# Patient Record
Sex: FEMALE | Race: WHITE | NOT HISPANIC OR LATINO | Employment: FULL TIME | URBAN - METROPOLITAN AREA
[De-identification: names, ages, dates, MRNs, and addresses within clinical notes are randomized per-mention and may not be internally consistent; named-entity substitution may affect disease eponyms.]

---

## 2019-01-09 ENCOUNTER — OFFICE VISIT (OUTPATIENT)
Dept: FAMILY MEDICINE | Facility: CLINIC | Age: 39
End: 2019-01-09
Payer: COMMERCIAL

## 2019-01-09 VITALS
WEIGHT: 165.69 LBS | OXYGEN SATURATION: 99 % | HEART RATE: 103 BPM | BODY MASS INDEX: 29.36 KG/M2 | DIASTOLIC BLOOD PRESSURE: 74 MMHG | HEIGHT: 63 IN | TEMPERATURE: 99 F | SYSTOLIC BLOOD PRESSURE: 112 MMHG

## 2019-01-09 DIAGNOSIS — F32.A DEPRESSION, UNSPECIFIED DEPRESSION TYPE: ICD-10-CM

## 2019-01-09 DIAGNOSIS — F90.0 ADHD, PREDOMINANTLY INATTENTIVE TYPE: ICD-10-CM

## 2019-01-09 DIAGNOSIS — N30.00 ACUTE CYSTITIS WITHOUT HEMATURIA: Primary | ICD-10-CM

## 2019-01-09 PROCEDURE — 3008F BODY MASS INDEX DOCD: CPT | Mod: CPTII,S$GLB,, | Performed by: FAMILY MEDICINE

## 2019-01-09 PROCEDURE — 99999 PR PBB SHADOW E&M-NEW PATIENT-LVL III: ICD-10-PCS | Mod: PBBFAC,,, | Performed by: FAMILY MEDICINE

## 2019-01-09 PROCEDURE — 3008F PR BODY MASS INDEX (BMI) DOCUMENTED: ICD-10-PCS | Mod: CPTII,S$GLB,, | Performed by: FAMILY MEDICINE

## 2019-01-09 PROCEDURE — 99204 PR OFFICE/OUTPT VISIT, NEW, LEVL IV, 45-59 MIN: ICD-10-PCS | Mod: S$GLB,,, | Performed by: FAMILY MEDICINE

## 2019-01-09 PROCEDURE — 99204 OFFICE O/P NEW MOD 45 MIN: CPT | Mod: S$GLB,,, | Performed by: FAMILY MEDICINE

## 2019-01-09 PROCEDURE — 99999 PR PBB SHADOW E&M-NEW PATIENT-LVL III: CPT | Mod: PBBFAC,,, | Performed by: FAMILY MEDICINE

## 2019-01-09 RX ORDER — NITROFURANTOIN 25; 75 MG/1; MG/1
100 CAPSULE ORAL 2 TIMES DAILY
Qty: 6 CAPSULE | Refills: 0 | Status: SHIPPED | OUTPATIENT
Start: 2019-01-09 | End: 2019-01-12

## 2019-01-09 RX ORDER — DESIPRAMINE HYDROCHLORIDE 75 MG/1
75 TABLET ORAL DAILY
COMMUNITY
End: 2019-01-09 | Stop reason: SDUPTHER

## 2019-01-09 RX ORDER — DESIPRAMINE HYDROCHLORIDE 75 MG/1
75 TABLET ORAL NIGHTLY
Qty: 90 TABLET | Refills: 0 | Status: SHIPPED | OUTPATIENT
Start: 2019-01-09 | End: 2019-05-07 | Stop reason: SDUPTHER

## 2019-01-09 NOTE — PROGRESS NOTES
Subjective:   Patient ID:  Shannan Alford is a 38 y.o. female.    Chief Complaint:  Establish Care and Urinary Tract Infection    Past Medical History:   Diagnosis Date    ADHD (attention deficit hyperactivity disorder)     Depression      History reviewed. No pertinent surgical history.  Family History   Problem Relation Age of Onset    Hypertension Mother     No Known Problems Father      Review of patient's allergies indicates:  No Known Allergies      Patient presents for evaluation of urinary tract infection.  Reports recurrent UTI approximately 2-3 per year.  Last episode July.  Treated with Macrobid or Levaquin.  Symptoms similar to previous episodes  Medical history for depression and ADHD.  Stable on desipramine.  Needs refill.  Recently relocated from dialysis.  .  No additional complaints concerns today.      Urinary Tract Infection    This is a recurrent problem. The current episode started acute onset. The problem occurs every urination. The problem has been unchanged. The pain is at a severity of 0/10. The patient is experiencing no pain. The maximum temperature recorded prior to her arrival was 100 - 100.9 F. The fever has been present for less than 1 day. She is sexually active. There is no history of pyelonephritis. Associated symptoms include frequency and urgency. Pertinent negatives include no behavior changes, chills, discharge, flank pain, hematuria, hesitancy, nausea, possible pregnancy, sweats, vomiting, weight loss, bubble bath use, constipation, rash or withholding. She has tried nothing for the symptoms. Her past medical history is significant for recurrent UTIs.       Review of Systems   Constitutional: Negative for chills, fatigue, fever and weight loss.   Respiratory: Negative for shortness of breath.    Cardiovascular: Negative for chest pain and leg swelling.   Gastrointestinal: Negative for abdominal pain, constipation, diarrhea, nausea and vomiting.   Genitourinary:  "Positive for frequency and urgency. Negative for decreased urine volume, difficulty urinating, dysuria, enuresis, flank pain, genital sores, hematuria, hesitancy, menstrual problem, pelvic pain, vaginal bleeding, vaginal discharge and vaginal pain.   Musculoskeletal: Negative for myalgias.   Skin: Negative for rash.   Hematological: Negative for adenopathy.       Objective:   /74 (BP Location: Right arm, Patient Position: Sitting, BP Method: Medium (Manual))   Pulse 103   Temp 98.8 °F (37.1 °C) (Tympanic)   Ht 5' 3" (1.6 m)   Wt 75.1 kg (165 lb 10.8 oz)   LMP 12/20/2018   SpO2 99%   BMI 29.35 kg/m²     Physical Exam   Constitutional: Vital signs are normal. She appears well-developed and well-nourished. She is cooperative.  Non-toxic appearance. She does not have a sickly appearance. She does not appear ill. No distress.   HENT:   Mouth/Throat: Oropharynx is clear and moist and mucous membranes are normal.   Eyes: No scleral icterus.   Neck: No thyroid mass and no thyromegaly present.   Cardiovascular: Normal rate, regular rhythm and normal heart sounds. Exam reveals no gallop and no friction rub.   No murmur heard.  Pulmonary/Chest: Effort normal and breath sounds normal. No tachypnea and no bradypnea. No respiratory distress. She has no wheezes. She has no rales.   Abdominal: Soft. Bowel sounds are normal. She exhibits no distension. There is no hepatosplenomegaly. There is tenderness in the suprapubic area. There is no rebound, no guarding and no CVA tenderness.   Musculoskeletal: She exhibits no edema.   Neurological: She displays a negative Romberg sign. Coordination and gait normal.   Skin: Skin is warm, dry and intact. No abrasion, no bruising, no ecchymosis and no rash noted.   Psychiatric: She has a normal mood and affect. Her speech is normal and behavior is normal. Judgment and thought content normal. Her mood appears not anxious. Her affect is not angry, not blunt, not labile and not " inappropriate. She is not agitated, not aggressive, not hyperactive, not slowed, not withdrawn, not actively hallucinating and not combative. Thought content is not paranoid and not delusional. Cognition and memory are normal. She does not exhibit a depressed mood. She expresses no homicidal and no suicidal ideation. She is attentive.   Nursing note and vitals reviewed.    Assessment:     1. Acute cystitis without hematuria    2. Depression, unspecified depression type    3. ADHD, predominantly inattentive type      Plan:   Acute cystitis without hematuria  -     nitrofurantoin, macrocrystal-monohydrate, (MACROBID) 100 MG capsule; Take 1 capsule (100 mg total) by mouth 2 (two) times daily. for 3 days  Dispense: 6 capsule; Refill: 0  Treat empirically with Macrobid.  Recheck UA CNS to document resolution at annual physical exam    Depression, unspecified depression type  ADHD, predominantly inattentive type  -     desipramine (NORPRAMIN) 75 MG tablet; Take 1 tablet (75 mg total) by mouth nightly.  Dispense: 90 tablet; Refill: 0  Effective with symptoms controlled on present medication and dose.  Continue desipramine 75 mg daily.    Return to clinic 2 weeks for annual physical exam.

## 2019-05-07 DIAGNOSIS — F32.A DEPRESSION, UNSPECIFIED DEPRESSION TYPE: ICD-10-CM

## 2019-05-07 DIAGNOSIS — F90.0 ADHD, PREDOMINANTLY INATTENTIVE TYPE: ICD-10-CM

## 2019-05-07 RX ORDER — DESIPRAMINE HYDROCHLORIDE 75 MG/1
75 TABLET ORAL NIGHTLY
Qty: 30 TABLET | Refills: 0 | Status: SHIPPED | OUTPATIENT
Start: 2019-05-07 | End: 2019-06-12 | Stop reason: SDUPTHER

## 2019-05-07 NOTE — TELEPHONE ENCOUNTER
Approved but for a one month supply only   Patient needs a follow up appointment.  No additional refills without a visit.  Please call and schedule.

## 2019-05-07 NOTE — TELEPHONE ENCOUNTER
Patient advised medication refill sent to pharmacy.  Patient will schedule follow up appointment online.

## 2019-06-12 ENCOUNTER — TELEPHONE (OUTPATIENT)
Dept: INTERNAL MEDICINE | Facility: CLINIC | Age: 39
End: 2019-06-12

## 2019-06-12 ENCOUNTER — OFFICE VISIT (OUTPATIENT)
Dept: INTERNAL MEDICINE | Facility: CLINIC | Age: 39
End: 2019-06-12
Payer: COMMERCIAL

## 2019-06-12 ENCOUNTER — LAB VISIT (OUTPATIENT)
Dept: LAB | Facility: HOSPITAL | Age: 39
End: 2019-06-12
Attending: FAMILY MEDICINE
Payer: COMMERCIAL

## 2019-06-12 VITALS
WEIGHT: 160.06 LBS | BODY MASS INDEX: 28.36 KG/M2 | HEART RATE: 109 BPM | TEMPERATURE: 98 F | SYSTOLIC BLOOD PRESSURE: 104 MMHG | HEIGHT: 63 IN | DIASTOLIC BLOOD PRESSURE: 80 MMHG | OXYGEN SATURATION: 99 %

## 2019-06-12 DIAGNOSIS — F32.A DEPRESSION, UNSPECIFIED DEPRESSION TYPE: ICD-10-CM

## 2019-06-12 DIAGNOSIS — R10.9 RIGHT FLANK PAIN: ICD-10-CM

## 2019-06-12 DIAGNOSIS — N30.00 ACUTE CYSTITIS WITHOUT HEMATURIA: Primary | ICD-10-CM

## 2019-06-12 DIAGNOSIS — F90.0 ADHD, PREDOMINANTLY INATTENTIVE TYPE: ICD-10-CM

## 2019-06-12 DIAGNOSIS — Z00.00 ANNUAL PHYSICAL EXAM: ICD-10-CM

## 2019-06-12 DIAGNOSIS — Z00.00 ANNUAL PHYSICAL EXAM: Primary | ICD-10-CM

## 2019-06-12 PROBLEM — N20.0 NEPHROLITHIASIS: Status: ACTIVE | Noted: 2019-06-12

## 2019-06-12 LAB
ALBUMIN SERPL BCP-MCNC: 3.9 G/DL (ref 3.5–5.2)
ALP SERPL-CCNC: 95 U/L (ref 55–135)
ALT SERPL W/O P-5'-P-CCNC: 10 U/L (ref 10–44)
ANION GAP SERPL CALC-SCNC: 9 MMOL/L (ref 8–16)
AST SERPL-CCNC: 15 U/L (ref 10–40)
BILIRUB SERPL-MCNC: 0.4 MG/DL (ref 0.1–1)
BUN SERPL-MCNC: 11 MG/DL (ref 6–20)
CALCIUM SERPL-MCNC: 9.9 MG/DL (ref 8.7–10.5)
CHLORIDE SERPL-SCNC: 105 MMOL/L (ref 95–110)
CHOLEST SERPL-MCNC: 196 MG/DL (ref 120–199)
CHOLEST/HDLC SERPL: 3.3 {RATIO} (ref 2–5)
CO2 SERPL-SCNC: 25 MMOL/L (ref 23–29)
CREAT SERPL-MCNC: 1 MG/DL (ref 0.5–1.4)
ERYTHROCYTE [DISTWIDTH] IN BLOOD BY AUTOMATED COUNT: 12 % (ref 11.5–14.5)
EST. GFR  (AFRICAN AMERICAN): >60 ML/MIN/1.73 M^2
EST. GFR  (NON AFRICAN AMERICAN): >60 ML/MIN/1.73 M^2
GLUCOSE SERPL-MCNC: 82 MG/DL (ref 70–110)
HCT VFR BLD AUTO: 45 % (ref 37–48.5)
HDLC SERPL-MCNC: 59 MG/DL (ref 40–75)
HDLC SERPL: 30.1 % (ref 20–50)
HGB BLD-MCNC: 14.4 G/DL (ref 12–16)
LDLC SERPL CALC-MCNC: 126.4 MG/DL (ref 63–159)
MCH RBC QN AUTO: 32 PG (ref 27–31)
MCHC RBC AUTO-ENTMCNC: 32 G/DL (ref 32–36)
MCV RBC AUTO: 100 FL (ref 82–98)
NONHDLC SERPL-MCNC: 137 MG/DL
PLATELET # BLD AUTO: 344 K/UL (ref 150–350)
PMV BLD AUTO: 11.3 FL (ref 9.2–12.9)
POTASSIUM SERPL-SCNC: 4.2 MMOL/L (ref 3.5–5.1)
PROT SERPL-MCNC: 7.6 G/DL (ref 6–8.4)
RBC # BLD AUTO: 4.5 M/UL (ref 4–5.4)
SODIUM SERPL-SCNC: 139 MMOL/L (ref 136–145)
TRIGL SERPL-MCNC: 53 MG/DL (ref 30–150)
TSH SERPL DL<=0.005 MIU/L-ACNC: 2.11 UIU/ML (ref 0.4–4)
WBC # BLD AUTO: 5.98 K/UL (ref 3.9–12.7)

## 2019-06-12 PROCEDURE — 99214 OFFICE O/P EST MOD 30 MIN: CPT | Mod: 25,S$GLB,, | Performed by: FAMILY MEDICINE

## 2019-06-12 PROCEDURE — 80053 COMPREHEN METABOLIC PANEL: CPT

## 2019-06-12 PROCEDURE — 3008F PR BODY MASS INDEX (BMI) DOCUMENTED: ICD-10-PCS | Mod: CPTII,S$GLB,, | Performed by: FAMILY MEDICINE

## 2019-06-12 PROCEDURE — 99999 PR PBB SHADOW E&M-EST. PATIENT-LVL III: CPT | Mod: PBBFAC,,, | Performed by: FAMILY MEDICINE

## 2019-06-12 PROCEDURE — 99214 PR OFFICE/OUTPT VISIT, EST, LEVL IV, 30-39 MIN: ICD-10-PCS | Mod: 25,S$GLB,, | Performed by: FAMILY MEDICINE

## 2019-06-12 PROCEDURE — 99999 PR PBB SHADOW E&M-EST. PATIENT-LVL III: ICD-10-PCS | Mod: PBBFAC,,, | Performed by: FAMILY MEDICINE

## 2019-06-12 PROCEDURE — 99395 PREV VISIT EST AGE 18-39: CPT | Mod: S$GLB,,, | Performed by: FAMILY MEDICINE

## 2019-06-12 PROCEDURE — 84443 ASSAY THYROID STIM HORMONE: CPT

## 2019-06-12 PROCEDURE — 80061 LIPID PANEL: CPT

## 2019-06-12 PROCEDURE — 36415 COLL VENOUS BLD VENIPUNCTURE: CPT

## 2019-06-12 PROCEDURE — 99395 PR PREVENTIVE VISIT,EST,18-39: ICD-10-PCS | Mod: S$GLB,,, | Performed by: FAMILY MEDICINE

## 2019-06-12 PROCEDURE — 85027 COMPLETE CBC AUTOMATED: CPT

## 2019-06-12 PROCEDURE — 3008F BODY MASS INDEX DOCD: CPT | Mod: CPTII,S$GLB,, | Performed by: FAMILY MEDICINE

## 2019-06-12 RX ORDER — CIPROFLOXACIN 500 MG/1
500 TABLET ORAL 2 TIMES DAILY
Qty: 20 TABLET | Refills: 0 | Status: SHIPPED | OUTPATIENT
Start: 2019-06-12 | End: 2019-06-22

## 2019-06-12 RX ORDER — DESIPRAMINE HYDROCHLORIDE 75 MG/1
75 TABLET ORAL NIGHTLY
Qty: 90 TABLET | Refills: 3 | Status: ON HOLD | OUTPATIENT
Start: 2019-06-12 | End: 2019-08-02 | Stop reason: HOSPADM

## 2019-06-12 NOTE — TELEPHONE ENCOUNTER
----- Message from Brent Nuñez MD sent at 6/12/2019  9:39 AM CDT -----  Urine with positive white cells and bacteria.  No blood suspicious for stone.    Empiric Cipro 500 mg twice a day.    Await urine culture.

## 2019-06-12 NOTE — PROGRESS NOTES
Subjective:   Patient ID: Shannan Alford is a 38 y.o. female.  Chief Complaint:  Kidney infections and Labs Only      Patient presents for annual physical exam, follow-up on ADHD and depression, and to discuss recurrent right flank pain and possible kidney infections.    Last visit January 2019.  UTI based on symptoms / history.  Macrobid.  Do not return due to loss of insurance for APE and urine culture.    No fever.  No nausea / vomiting.  Primary symptom is just right flank pain.  Reports 3 episodes in past 6 months.   With to previous episodes told urine culture positive for infection.  However no test of cure post antibiotics.  History of kidney stones with lithotripsy and stent placement    ADHD/Depression on Desipramine 75 mg at bedtime . Effective with symptoms controlled on present medication and dose. Denies any side effects.    Happy with medication.  Wants to continue.    Reports Gyn exam and pap smear up-to-date from provider in [place]  No family history colon or breast cancer   Reports Tetanus booster in 2010.      Current Outpatient Medications:     desipramine (NORPRAMIN) 75 MG tablet, Take 1 tablet (75 mg total) by mouth nightly., Disp: 90 tablet, Rfl: 3    Review of Systems   Constitutional: Negative for chills, fatigue and fever.   HENT: Negative for congestion, dental problem, ear pain, postnasal drip, rhinorrhea, sinus pressure and sore throat.    Eyes: Negative for visual disturbance.   Respiratory: Negative for cough, chest tightness, shortness of breath and wheezing.    Cardiovascular: Negative for chest pain, palpitations and leg swelling.   Gastrointestinal: Negative for abdominal pain, blood in stool, constipation, diarrhea, nausea and vomiting.   Endocrine: Negative for polydipsia, polyphagia and polyuria.   Genitourinary: Positive for flank pain. Negative for decreased urine volume, difficulty urinating, dyspareunia, dysuria, enuresis, frequency, genital sores, hematuria, menstrual  "problem, pelvic pain, urgency, vaginal bleeding, vaginal discharge and vaginal pain.   Musculoskeletal: Negative for myalgias.   Skin: Negative for rash.   Neurological: Negative for dizziness, syncope, weakness, light-headedness and headaches.   Hematological: Negative for adenopathy.   Psychiatric/Behavioral: Negative for decreased concentration, dysphoric mood and sleep disturbance. The patient is not nervous/anxious.      Objective:   /80 (BP Location: Left arm, Patient Position: Sitting, BP Method: Medium (Manual))   Pulse 109   Temp 97.6 °F (36.4 °C) (Tympanic)   Ht 5' 3" (1.6 m)   Wt 72.6 kg (160 lb 0.9 oz)   LMP 05/28/2019   SpO2 99%   BMI 28.35 kg/m²     Physical Exam   Constitutional: She is oriented to person, place, and time. Vital signs are normal. She appears well-developed and well-nourished.   HENT:   Right Ear: Hearing, tympanic membrane, external ear and ear canal normal.   Left Ear: Hearing, tympanic membrane, external ear and ear canal normal.   Nose: Nose normal. Right sinus exhibits no maxillary sinus tenderness and no frontal sinus tenderness. Left sinus exhibits no maxillary sinus tenderness and no frontal sinus tenderness.   Mouth/Throat: Uvula is midline, oropharynx is clear and moist and mucous membranes are normal.   Eyes: Pupils are equal, round, and reactive to light. Conjunctivae, EOM and lids are normal.   Neck: No JVD present. No thyroid mass and no thyromegaly present.   Cardiovascular: Normal rate, regular rhythm and normal heart sounds. Exam reveals no gallop and no friction rub.   No murmur heard.  Pulses:       Radial pulses are 2+ on the right side, and 2+ on the left side.   Pulmonary/Chest: Effort normal and breath sounds normal. She has no wheezes. She has no rhonchi. She has no rales.   Abdominal: CVA tenderness: Right.   Musculoskeletal: She exhibits no edema.   Neurological: She is oriented to person, place, and time. She displays a negative Romberg sign. " Coordination and gait normal.   Skin: Skin is warm, dry and intact. No rash noted.   Nursing note and vitals reviewed.      Flank Pain  Abdominal: Soft. Bowel sounds are normal. She exhibits no distension. There is no tenderness. There is CVA tenderness (Right). There is no rebound and no guarding.     ADHD/Depression  Psychiatric: She has a normal mood and affect. Her behavior is normal. Judgment and thought content normal.   Nursing note and vitals reviewed.    Assessment:       ICD-10-CM ICD-9-CM   1. Annual physical exam Z00.00 V70.0   2. Right flank pain R10.9 789.09   3. Depression, unspecified depression type F32.9 311   4. ADHD, predominantly inattentive type F90.0 314.00     Plan:   Annual physical exam  -     CBC Without Differential; Future; Expected date: 06/12/2019  -     Comprehensive metabolic panel; Future; Expected date: 06/12/2019  -     Lipid panel; Future; Expected date: 06/12/2019  -     TSH; Future; Expected date: 06/12/2019  -     Urinalysis; Future; Expected date: 06/12/2019  -     Urine culture; Future; Expected date: 06/12/2019  Blood pressure normal.  BMI 28.  Exam remarkable for right CVA tenderness.    Check labs.  Treat as indicated.    Tetanus booster up-to-date.  Flu vaccine advised this season.    Records requested from gyn provider.      Right flank pain  -     Urinalysis; Future; Expected date: 06/12/2019  -     Urine culture; Future; Expected date: 06/12/2019  If urine dipstick /microscopic positive for white cells, empiric antibiotic treatment while awaiting urine culture  If urine dipstick/microscopic positive for blood cells, CT abdomen and pelvis stone protocol  Referral to urologist if indicated     Depression, unspecified depression type  ADHD, predominantly inattentive type  -     desipramine (NORPRAMIN) 75 MG tablet; Take 1 tablet (75 mg total) by mouth nightly.  Dispense: 90 tablet; Refill: 3  Stable, no side effects, mood and symptoms well controlled on present  medication .  Continue present medication    Return to clinic 1 year for annual physical exam or sooner as needed.

## 2019-06-17 DIAGNOSIS — N39.0 E. COLI UTI: Primary | ICD-10-CM

## 2019-06-17 DIAGNOSIS — B96.20 E. COLI UTI: Primary | ICD-10-CM

## 2019-06-18 ENCOUNTER — PATIENT OUTREACH (OUTPATIENT)
Dept: ADMINISTRATIVE | Facility: HOSPITAL | Age: 39
End: 2019-06-18

## 2019-06-19 ENCOUNTER — TELEPHONE (OUTPATIENT)
Dept: INTERNAL MEDICINE | Facility: CLINIC | Age: 39
End: 2019-06-19

## 2019-06-19 NOTE — TELEPHONE ENCOUNTER
----- Message from Brent Nuñez MD sent at 6/17/2019  8:12 AM CDT -----  Urine culture confirms infection with E coli.    Susceptible to Cipro as prescribed.    She should be feeling better.    Let's repeat UA and urine culture in 2 weeks to make sure the infection has resolved.  If infection or abnormalities persist after treatment, will need to see Urology.

## 2019-07-02 ENCOUNTER — PATIENT OUTREACH (OUTPATIENT)
Dept: ADMINISTRATIVE | Facility: HOSPITAL | Age: 39
End: 2019-07-02

## 2019-07-15 ENCOUNTER — PATIENT MESSAGE (OUTPATIENT)
Dept: INTERNAL MEDICINE | Facility: CLINIC | Age: 39
End: 2019-07-15

## 2019-07-15 NOTE — TELEPHONE ENCOUNTER
She has a repeat urine culture ordered from her last infection that was never done.    She should have the urine culture done.    Please schedule.  If the infection persist, I will retreat with antibiotics and I will refer her to Urology.

## 2019-07-16 NOTE — TELEPHONE ENCOUNTER
It would be a urologist not a nephrologist.    Referral or not, she needs to have the repeat  urine culture done.  I can refer to Dr. Way internally  which may take a while or I can use the [facility] which can see her faster.

## 2019-07-31 ENCOUNTER — HOSPITAL ENCOUNTER (OUTPATIENT)
Facility: HOSPITAL | Age: 39
Discharge: HOME | End: 2019-08-02
Attending: EMERGENCY MEDICINE | Admitting: HOSPITALIST
Payer: COMMERCIAL

## 2019-07-31 DIAGNOSIS — N39.0 RECURRENT UTI (URINARY TRACT INFECTION): ICD-10-CM

## 2019-07-31 DIAGNOSIS — N12 PYELONEPHRITIS: ICD-10-CM

## 2019-07-31 DIAGNOSIS — N20.0 STAGHORN CALCULUS: ICD-10-CM

## 2019-07-31 DIAGNOSIS — A41.9 SEPSIS, DUE TO UNSPECIFIED ORGANISM: Primary | ICD-10-CM

## 2019-07-31 LAB
ALBUMIN SERPL BCP-MCNC: 4.1 G/DL (ref 3.5–5.2)
ALP SERPL-CCNC: 92 U/L (ref 55–135)
ALT SERPL W/O P-5'-P-CCNC: 10 U/L (ref 10–44)
ANION GAP SERPL CALC-SCNC: 10 MMOL/L (ref 8–16)
AST SERPL-CCNC: 12 U/L (ref 10–40)
BACTERIA #/AREA URNS HPF: ABNORMAL /HPF
BASOPHILS # BLD AUTO: 0.01 K/UL (ref 0–0.2)
BASOPHILS NFR BLD: 0.1 % (ref 0–1.9)
BILIRUB SERPL-MCNC: 0.5 MG/DL (ref 0.1–1)
BILIRUB UR QL STRIP: NEGATIVE
BUN SERPL-MCNC: 9 MG/DL (ref 6–20)
CALCIUM SERPL-MCNC: 9.8 MG/DL (ref 8.7–10.5)
CHLORIDE SERPL-SCNC: 100 MMOL/L (ref 95–110)
CLARITY UR: ABNORMAL
CO2 SERPL-SCNC: 27 MMOL/L (ref 23–29)
COLOR UR: YELLOW
CREAT SERPL-MCNC: 0.9 MG/DL (ref 0.5–1.4)
CRP SERPL-MCNC: 107.5 MG/L (ref 0–8.2)
DIFFERENTIAL METHOD: ABNORMAL
EOSINOPHIL # BLD AUTO: 0 K/UL (ref 0–0.5)
EOSINOPHIL NFR BLD: 0.1 % (ref 0–8)
ERYTHROCYTE [DISTWIDTH] IN BLOOD BY AUTOMATED COUNT: 12.3 % (ref 11.5–14.5)
EST. GFR  (AFRICAN AMERICAN): >60 ML/MIN/1.73 M^2
EST. GFR  (NON AFRICAN AMERICAN): >60 ML/MIN/1.73 M^2
GLUCOSE SERPL-MCNC: 93 MG/DL (ref 70–110)
GLUCOSE UR QL STRIP: NEGATIVE
HCT VFR BLD AUTO: 40.9 % (ref 37–48.5)
HGB BLD-MCNC: 14.2 G/DL (ref 12–16)
HGB UR QL STRIP: ABNORMAL
KETONES UR QL STRIP: NEGATIVE
LACTATE SERPL-SCNC: 0.8 MMOL/L (ref 0.5–2.2)
LACTATE SERPL-SCNC: 0.9 MMOL/L (ref 0.5–2.2)
LEUKOCYTE ESTERASE UR QL STRIP: ABNORMAL
LYMPHOCYTES # BLD AUTO: 2.1 K/UL (ref 1–4.8)
LYMPHOCYTES NFR BLD: 13 % (ref 18–48)
MCH RBC QN AUTO: 32.7 PG (ref 27–31)
MCHC RBC AUTO-ENTMCNC: 34.7 G/DL (ref 32–36)
MCV RBC AUTO: 94 FL (ref 82–98)
MICROSCOPIC COMMENT: ABNORMAL
MONOCYTES # BLD AUTO: 1.7 K/UL (ref 0.3–1)
MONOCYTES NFR BLD: 10.5 % (ref 4–15)
NEUTROPHILS # BLD AUTO: 12.3 K/UL (ref 1.8–7.7)
NEUTROPHILS NFR BLD: 76.6 % (ref 38–73)
NITRITE UR QL STRIP: NEGATIVE
PH UR STRIP: 7 [PH] (ref 5–8)
PLATELET # BLD AUTO: 305 K/UL (ref 150–350)
PMV BLD AUTO: 10.6 FL (ref 9.2–12.9)
POTASSIUM SERPL-SCNC: 3.6 MMOL/L (ref 3.5–5.1)
PROT SERPL-MCNC: 8.2 G/DL (ref 6–8.4)
PROT UR QL STRIP: NEGATIVE
RBC # BLD AUTO: 4.34 M/UL (ref 4–5.4)
RBC #/AREA URNS HPF: 0 /HPF (ref 0–4)
SODIUM SERPL-SCNC: 137 MMOL/L (ref 136–145)
SP GR UR STRIP: 1.01 (ref 1–1.03)
SQUAMOUS #/AREA URNS HPF: 3 /HPF
URN SPEC COLLECT METH UR: ABNORMAL
UROBILINOGEN UR STRIP-ACNC: NEGATIVE EU/DL
WBC # BLD AUTO: 16.16 K/UL (ref 3.9–12.7)
WBC #/AREA URNS HPF: 40 /HPF (ref 0–5)

## 2019-07-31 PROCEDURE — 87040 BLOOD CULTURE FOR BACTERIA: CPT

## 2019-07-31 PROCEDURE — 81025 URINE PREGNANCY TEST: CPT

## 2019-07-31 PROCEDURE — 85025 COMPLETE CBC W/AUTO DIFF WBC: CPT

## 2019-07-31 PROCEDURE — 96365 THER/PROPH/DIAG IV INF INIT: CPT

## 2019-07-31 PROCEDURE — G0378 HOSPITAL OBSERVATION PER HR: HCPCS

## 2019-07-31 PROCEDURE — 87088 URINE BACTERIA CULTURE: CPT

## 2019-07-31 PROCEDURE — 96375 TX/PRO/DX INJ NEW DRUG ADDON: CPT

## 2019-07-31 PROCEDURE — 25000003 PHARM REV CODE 250: Performed by: EMERGENCY MEDICINE

## 2019-07-31 PROCEDURE — 11000001 HC ACUTE MED/SURG PRIVATE ROOM

## 2019-07-31 PROCEDURE — 87086 URINE CULTURE/COLONY COUNT: CPT

## 2019-07-31 PROCEDURE — 87077 CULTURE AEROBIC IDENTIFY: CPT

## 2019-07-31 PROCEDURE — 25500020 PHARM REV CODE 255: Performed by: EMERGENCY MEDICINE

## 2019-07-31 PROCEDURE — 86140 C-REACTIVE PROTEIN: CPT

## 2019-07-31 PROCEDURE — 83605 ASSAY OF LACTIC ACID: CPT

## 2019-07-31 PROCEDURE — 87186 SC STD MICRODIL/AGAR DIL: CPT

## 2019-07-31 PROCEDURE — 81000 URINALYSIS NONAUTO W/SCOPE: CPT

## 2019-07-31 PROCEDURE — 96361 HYDRATE IV INFUSION ADD-ON: CPT

## 2019-07-31 PROCEDURE — 99291 CRITICAL CARE FIRST HOUR: CPT | Mod: 25

## 2019-07-31 PROCEDURE — 80053 COMPREHEN METABOLIC PANEL: CPT

## 2019-07-31 PROCEDURE — 63600175 PHARM REV CODE 636 W HCPCS: Performed by: EMERGENCY MEDICINE

## 2019-07-31 RX ORDER — IBUPROFEN 200 MG
16 TABLET ORAL
Status: DISCONTINUED | OUTPATIENT
Start: 2019-07-31 | End: 2019-08-02 | Stop reason: HOSPADM

## 2019-07-31 RX ORDER — ACETAMINOPHEN 325 MG/1
325 TABLET ORAL EVERY 4 HOURS PRN
Status: DISCONTINUED | OUTPATIENT
Start: 2019-07-31 | End: 2019-08-02 | Stop reason: HOSPADM

## 2019-07-31 RX ORDER — KETOROLAC TROMETHAMINE 30 MG/ML
15 INJECTION, SOLUTION INTRAMUSCULAR; INTRAVENOUS
Status: COMPLETED | OUTPATIENT
Start: 2019-07-31 | End: 2019-07-31

## 2019-07-31 RX ORDER — KETOROLAC TROMETHAMINE 30 MG/ML
15 INJECTION, SOLUTION INTRAMUSCULAR; INTRAVENOUS EVERY 6 HOURS PRN
Status: DISCONTINUED | OUTPATIENT
Start: 2019-07-31 | End: 2019-08-02 | Stop reason: HOSPADM

## 2019-07-31 RX ORDER — IBUPROFEN 200 MG
24 TABLET ORAL
Status: DISCONTINUED | OUTPATIENT
Start: 2019-07-31 | End: 2019-08-02 | Stop reason: HOSPADM

## 2019-07-31 RX ORDER — DESIPRAMINE HYDROCHLORIDE 25 MG/1
75 TABLET ORAL NIGHTLY
Status: DISCONTINUED | OUTPATIENT
Start: 2019-07-31 | End: 2019-08-01

## 2019-07-31 RX ORDER — FAMOTIDINE 20 MG/1
20 TABLET, FILM COATED ORAL 2 TIMES DAILY
Status: DISCONTINUED | OUTPATIENT
Start: 2019-08-01 | End: 2019-08-02 | Stop reason: HOSPADM

## 2019-07-31 RX ORDER — GLUCAGON 1 MG
1 KIT INJECTION
Status: DISCONTINUED | OUTPATIENT
Start: 2019-07-31 | End: 2019-08-02 | Stop reason: HOSPADM

## 2019-07-31 RX ORDER — CEFEPIME HYDROCHLORIDE 2 G/50ML
2 INJECTION, SOLUTION INTRAVENOUS
Status: DISCONTINUED | OUTPATIENT
Start: 2019-08-01 | End: 2019-07-31

## 2019-07-31 RX ORDER — ACETAMINOPHEN 500 MG
1000 TABLET ORAL
Status: COMPLETED | OUTPATIENT
Start: 2019-07-31 | End: 2019-07-31

## 2019-07-31 RX ORDER — SODIUM CHLORIDE 0.9 % (FLUSH) 0.9 %
5 SYRINGE (ML) INJECTION
Status: DISCONTINUED | OUTPATIENT
Start: 2019-07-31 | End: 2019-08-02 | Stop reason: HOSPADM

## 2019-07-31 RX ORDER — MEROPENEM AND SODIUM CHLORIDE 500 MG/50ML
500 INJECTION, SOLUTION INTRAVENOUS
Status: DISCONTINUED | OUTPATIENT
Start: 2019-08-01 | End: 2019-08-02 | Stop reason: HOSPADM

## 2019-07-31 RX ORDER — SODIUM CHLORIDE 9 MG/ML
INJECTION, SOLUTION INTRAVENOUS CONTINUOUS
Status: ACTIVE | OUTPATIENT
Start: 2019-08-01 | End: 2019-08-01

## 2019-07-31 RX ORDER — ONDANSETRON 2 MG/ML
4 INJECTION INTRAMUSCULAR; INTRAVENOUS EVERY 8 HOURS PRN
Status: DISCONTINUED | OUTPATIENT
Start: 2019-07-31 | End: 2019-08-02 | Stop reason: HOSPADM

## 2019-07-31 RX ADMIN — SODIUM CHLORIDE, SODIUM LACTATE, POTASSIUM CHLORIDE, AND CALCIUM CHLORIDE 2181 ML: .6; .31; .03; .02 INJECTION, SOLUTION INTRAVENOUS at 06:07

## 2019-07-31 RX ADMIN — SODIUM CHLORIDE: 0.9 INJECTION, SOLUTION INTRAVENOUS at 11:07

## 2019-07-31 RX ADMIN — CEFTRIAXONE 1 G: 1 INJECTION, SOLUTION INTRAVENOUS at 07:07

## 2019-07-31 RX ADMIN — KETOROLAC TROMETHAMINE 15 MG: 30 INJECTION, SOLUTION INTRAMUSCULAR at 07:07

## 2019-07-31 RX ADMIN — MEROPENEM AND SODIUM CHLORIDE 500 MG: 500 INJECTION, SOLUTION INTRAVENOUS at 11:07

## 2019-07-31 RX ADMIN — ACETAMINOPHEN 1000 MG: 500 TABLET ORAL at 07:07

## 2019-07-31 RX ADMIN — IOHEXOL 75 ML: 350 INJECTION, SOLUTION INTRAVENOUS at 07:07

## 2019-07-31 NOTE — ED PROVIDER NOTES
SCRIBE #1 NOTE: I, Viktoria Gama, am scribing for, and in the presence of, Javier Lozano MD. I have scribed the entire note.         History     Chief Complaint   Patient presents with    Pyelonephritis     pt states her PCP dx her with kidney infection, was on cipro and now sx are worse. Right flank pain and fever       Review of patient's allergies indicates:  No Known Allergies      History of Present Illness   HPI    7/31/2019, 5:48 PM  History obtained from the patient      History of Present Illness: Shannan Alford is a 38 y.o. female patient who presents to the Emergency Department for R flank pain which onset gradually 6/17/19. Patient reports having recurrent UTIs. Patient states she has UTI about every 6 weeks. Patient states she was started on Cipro on 6/17, but sxs have not improved. Patient states she was evaluated by her PCP today for sxs and was referred to the ED for further evaluation and admission. Symptoms are constant and moderate in severity. No mitigating or exacerbating factors reported. Associated sxs include nausea and fever (Tmax 103.5F). Patient denies any chills, cough, SOB, abdominal pain, dysuria, hematuria, urinary frequency/urgency, vomiting, and all other sxs at this time. Patient states she last took Tylenol at noon. Patient states she has not had urologic imaging. Patient reports having an established urologist in [place], but now lives in [place]. No further complaints or concerns at this time.     Arrival mode: Personal vehicle    PCP: Brent Nuñez MD        Past Medical History:  Past Medical History:   Diagnosis Date    ADHD (attention deficit hyperactivity disorder)     Depression        Past Surgical History:  History reviewed. No pertinent surgical history.      Family History:  Family History   Problem Relation Age of Onset    Hypertension Mother     No Known Problems Father        Social History:  Social History     Tobacco Use    Smoking status: Never  Smoker    Smokeless tobacco: Never Used   Substance and Sexual Activity    Alcohol use: Yes     Frequency: Monthly or less    Drug use: No    Sexual activity: Unknown        Review of Systems   Review of Systems   Constitutional: Positive for fever. Negative for chills.   HENT: Negative for sore throat.    Respiratory: Negative for cough and shortness of breath.    Cardiovascular: Negative for chest pain.   Gastrointestinal: Positive for nausea. Negative for abdominal pain and vomiting.   Genitourinary: Positive for flank pain (R sided). Negative for dysuria, frequency, hematuria and urgency.   Musculoskeletal: Negative for back pain.   Skin: Negative for rash.   Neurological: Negative for weakness.   Hematological: Does not bruise/bleed easily.   All other systems reviewed and are negative.       Physical Exam     Initial Vitals [07/31/19 1735]   BP Pulse Resp Temp SpO2   119/71 (!) 119 18 99.5 °F (37.5 °C) 100 %      MAP       --          Physical Exam  Nursing Notes and Vital Signs Reviewed.  Constitutional: Patient is in no acute distress. Well-developed and well-nourished.  Head: Atraumatic. Normocephalic.  Eyes: PERRL. EOM intact. Conjunctivae are not pale. No scleral icterus.  ENT: Mucous membranes are moist. Oropharynx is clear and symmetric.    Neck: Supple. Full ROM. No lymphadenopathy.  Cardiovascular: Tachycardic. Regular rhythm. No murmurs, rubs, or gallops. Distal pulses are 2+ and symmetric.  Pulmonary/Chest: Mild tachypnea. Clear to auscultation bilaterally. No wheezing or rales.  Abdominal: Soft and non-distended.  There is no tenderness.  No rebound, guarding, or rigidity.  Genitourinary: Right CVA tenderness  Musculoskeletal: Moves all extremities. No obvious deformities. No edema.   Skin: Warm and dry.  Neurological:  Alert, awake, and appropriate.  Normal speech.  No acute focal neurological deficits are appreciated.  Psychiatric: Normal affect. Good eye contact. Appropriate in content.      "ED Course   Critical Care  Date/Time: 7/31/2019 10:03 PM  Performed by: Javier Lozano MD  Authorized by: Javier Lozano MD   Direct patient critical care time: 9 minutes  Additional history critical care time: 7 minutes  Ordering / reviewing critical care time: 7 minutes  Documentation critical care time: 6 minutes  Consulting other physicians critical care time: 6 minutes  Total critical care time (exclusive of procedural time) : 35 minutes  Critical care time was exclusive of separately billable procedures and treating other patients and teaching time.  Critical care was necessary to treat or prevent imminent or life-threatening deterioration of the following conditions: sepsis.  Critical care was time spent personally by me on the following activities: blood draw for specimens, development of treatment plan with patient or surrogate, discussions with consultants, evaluation of patient's response to treatment, examination of patient, obtaining history from patient or surrogate, ordering and performing treatments and interventions, ordering and review of laboratory studies, ordering and review of radiographic studies, pulse oximetry, re-evaluation of patient's condition and review of old charts.        ED Vital Signs:  Vitals:    07/31/19 1735 07/31/19 1925 07/31/19 1931 07/31/19 2105   BP: 119/71  (!) 106/52 (!) 110/57   Pulse: (!) 119  103 89   Resp: 18      Temp: 99.5 °F (37.5 °C) 98.6 °F (37 °C)     TempSrc: Oral Oral     SpO2: 100%  100% 100%   Weight: 72.7 kg (160 lb 4.4 oz)      Height: 5' 3" (1.6 m)       07/31/19 2300 07/31/19 2338   BP: 109/63 114/62   Pulse: 82 83   Resp: 16    Temp: 98.5 °F (36.9 °C) 98 °F (36.7 °C)   TempSrc: Oral Oral   SpO2: 98% 99%   Weight:     Height:         Abnormal Lab Results:  Labs Reviewed   CBC W/ AUTO DIFFERENTIAL - Abnormal; Notable for the following components:       Result Value    WBC 16.16 (*)     Mean Corpuscular Hemoglobin 32.7 (*)     Gran # (ANC) 12.3 " (*)     Mono # 1.7 (*)     Gran% 76.6 (*)     Lymph% 13.0 (*)     All other components within normal limits   URINALYSIS, REFLEX TO URINE CULTURE - Abnormal; Notable for the following components:    Appearance, UA Hazy (*)     Occult Blood UA 2+ (*)     Leukocytes, UA 2+ (*)     All other components within normal limits    Narrative:     Preferred Collection Type->Urine, Clean Catch   URINALYSIS MICROSCOPIC - Abnormal; Notable for the following components:    WBC, UA 40 (*)     Bacteria Many (*)     All other components within normal limits    Narrative:     Preferred Collection Type->Urine, Clean Catch   C-REACTIVE PROTEIN - Abnormal; Notable for the following components:    .5 (*)     All other components within normal limits   CULTURE, URINE   COMPREHENSIVE METABOLIC PANEL   LACTIC ACID, PLASMA   LACTIC ACID, PLASMA   SEDIMENTATION RATE   C-REACTIVE PROTEIN        All Lab Results:  Results for orders placed or performed during the hospital encounter of 07/31/19   CBC auto differential   Result Value Ref Range    WBC 16.16 (H) 3.90 - 12.70 K/uL    RBC 4.34 4.00 - 5.40 M/uL    Hemoglobin 14.2 12.0 - 16.0 g/dL    Hematocrit 40.9 37.0 - 48.5 %    Mean Corpuscular Volume 94 82 - 98 fL    Mean Corpuscular Hemoglobin 32.7 (H) 27.0 - 31.0 pg    Mean Corpuscular Hemoglobin Conc 34.7 32.0 - 36.0 g/dL    RDW 12.3 11.5 - 14.5 %    Platelets 305 150 - 350 K/uL    MPV 10.6 9.2 - 12.9 fL    Gran # (ANC) 12.3 (H) 1.8 - 7.7 K/uL    Lymph # 2.1 1.0 - 4.8 K/uL    Mono # 1.7 (H) 0.3 - 1.0 K/uL    Eos # 0.0 0.0 - 0.5 K/uL    Baso # 0.01 0.00 - 0.20 K/uL    Gran% 76.6 (H) 38.0 - 73.0 %    Lymph% 13.0 (L) 18.0 - 48.0 %    Mono% 10.5 4.0 - 15.0 %    Eosinophil% 0.1 0.0 - 8.0 %    Basophil% 0.1 0.0 - 1.9 %    Differential Method Automated    Comprehensive metabolic panel   Result Value Ref Range    Sodium 137 136 - 145 mmol/L    Potassium 3.6 3.5 - 5.1 mmol/L    Chloride 100 95 - 110 mmol/L    CO2 27 23 - 29 mmol/L    Glucose 93  70 - 110 mg/dL    BUN, Bld 9 6 - 20 mg/dL    Creatinine 0.9 0.5 - 1.4 mg/dL    Calcium 9.8 8.7 - 10.5 mg/dL    Total Protein 8.2 6.0 - 8.4 g/dL    Albumin 4.1 3.5 - 5.2 g/dL    Total Bilirubin 0.5 0.1 - 1.0 mg/dL    Alkaline Phosphatase 92 55 - 135 U/L    AST 12 10 - 40 U/L    ALT 10 10 - 44 U/L    Anion Gap 10 8 - 16 mmol/L    eGFR if African American >60 >60 mL/min/1.73 m^2    eGFR if non African American >60 >60 mL/min/1.73 m^2   Lactic acid, plasma #1   Result Value Ref Range    Lactate (Lactic Acid) 0.9 0.5 - 2.2 mmol/L   Lactic acid, plasma #2   Result Value Ref Range    Lactate (Lactic Acid) 0.8 0.5 - 2.2 mmol/L   Urinalysis, Reflex to Urine Culture Urine, Clean Catch   Result Value Ref Range    Specimen UA Urine, Clean Catch     Color, UA Yellow Yellow, Straw, Qing    Appearance, UA Hazy (A) Clear    pH, UA 7.0 5.0 - 8.0    Specific Gravity, UA 1.010 1.005 - 1.030    Protein, UA Negative Negative    Glucose, UA Negative Negative    Ketones, UA Negative Negative    Bilirubin (UA) Negative Negative    Occult Blood UA 2+ (A) Negative    Nitrite, UA Negative Negative    Urobilinogen, UA Negative <2.0 EU/dL    Leukocytes, UA 2+ (A) Negative   Urinalysis Microscopic   Result Value Ref Range    RBC, UA 0 0 - 4 /hpf    WBC, UA 40 (H) 0 - 5 /hpf    Bacteria Many (A) None-Occ /hpf    Squam Epithel, UA 3 /hpf    Microscopic Comment SEE COMMENT    C-reactive protein   Result Value Ref Range    .8 (H) 0.0 - 8.2 mg/L   C-reactive protein   Result Value Ref Range    .5 (H) 0.0 - 8.2 mg/L       Imaging Results:  Imaging Results          CT Abdomen Pelvis With Contrast (Final result)  Result time 07/31/19 19:42:10    Final result by Isabelle Roa MD (07/31/19 19:42:10)                 Impression:      Bilateral nonobstructing renal calculi some with staghorn configuration.  There is no evidence of obstruction but the right ureter demonstrates a hyperenhancing wall which can be seen with  infection.    Simple appearing bilateral renal cysts.    All CT scans at this facility use dose modulation, iterative reconstruction and/or weight based dosing when appropriate to reduce radiation dose to as low as reasonably achievable.      Electronically signed by: Isabelle Antonioadrián  Date:    07/31/2019  Time:    19:42             Narrative:    EXAMINATION:  CT ABDOMEN PELVIS WITH CONTRAST    CLINICAL HISTORY:  Infection, abdomen-pelvis;    TECHNIQUE:  Axial CT imaging was performed through the abdomen and pelvis with intravenous contrast. Multiplanar reformats were performed and interpreted.    COMPARISON:  None    FINDINGS:  Lung bases are clear.    There are multiple bilateral nonobstructing renal calculi with staghorn configuration.  The right ureter is minimally dilated with a hyperenhancing wall.  There are bilateral simple appearing renal cysts.  The liver, spleen,, adrenal glands, and pancreas are normal.    The gallbladder is unremarkable.    No free fluid, free air, or inflammatory change.    The bowel is nondistended and within normal limits.    The abdominal aorta is normal in caliber.    The urinary bladder is unremarkable.    No significant osseous abnormality is identified.                                           The Emergency Provider reviewed the vital signs and test results, which are outlined above.     ED Discussion     9:27 PM: Re-evaluated pt. I have discussed test results, shared treatment plan, and the need for admission with patient. Pt expresses understanding at this time and agree with all information. All questions answered. Pt has no further questions or concerns at this time. Pt is ready for admit.    9:33 PM: Discussed pt's case with Dr. Dang (Hospital Medicine) who recommends consult urology.    9:45 PM: Discussed pt's case with Dr. Way (Urology) who states no surgical intervention tonight and states he will see patient tomorrow.    10:02 PM: Discussed case with Dr. Dang  (Hospital Medicine) who agrees with current care and management of pt and accepts admission.   Admitting Service: Hospital medicine   Admitting Physician: Dr. Dang  Admit to: Inpatient med/surg               ED Medication(s):  Medications   sodium chloride 0.9% flush 5 mL (has no administration in time range)   glucose chewable tablet 16 g (has no administration in time range)   glucose chewable tablet 24 g (has no administration in time range)   dextrose 50% injection 12.5 g (has no administration in time range)   dextrose 50% injection 25 g (has no administration in time range)   glucagon (human recombinant) injection 1 mg (has no administration in time range)   ondansetron injection 4 mg (has no administration in time range)   promethazine (PHENERGAN) 6.25 mg in dextrose 5 % 50 mL IVPB (has no administration in time range)   ketorolac injection 15 mg (has no administration in time range)   0.9%  NaCl infusion ( Intravenous New Bag 7/31/19 2357)   acetaminophen tablet 325 mg (has no administration in time range)   famotidine tablet 20 mg (has no administration in time range)   meropenem-0.9% sodium chloride 500 mg/50 mL IVPB (500 mg Intravenous New Bag 7/31/19 2357)   imipramine tablet 75 mg (75 mg Oral Given 8/1/19 0138)   vancomycin in dextrose 5 % 1 gram/250 mL IVPB 1,000 mg (1,000 mg Intravenous Trough Due 30 minutes Before Dose 8/2/19 1300)   lactated ringers bolus 2,181 mL (0 mL/kg × 72.7 kg Intravenous Stopped 7/31/19 2031)   cefTRIAXone (ROCEPHIN) 1 g in dextrose 5 % 50 mL IVPB (0 g Intravenous Stopped 7/31/19 1940)   ketorolac injection 15 mg (15 mg Intravenous Given 7/31/19 1909)   acetaminophen tablet 1,000 mg (1,000 mg Oral Given 7/31/19 1910)   iohexol (OMNIPAQUE 350) injection 75 mL (75 mLs Intravenous Given 7/31/19 1918)   vancomycin 2 g in dextrose 5 % 500 mL IVPB (2,000 mg Intravenous New Bag 8/1/19 0138)     Current Discharge Medication List                   Medical Decision Making     Medical  Decision Making:   Clinical Tests:   Lab Tests: Ordered and Reviewed  Radiological Study: Ordered and Reviewed             Scribe Attestation:   Scribe #1: I performed the above scribed service and the documentation accurately describes the services I performed. I attest to the accuracy of the note.     Attending:   Physician Attestation Statement for Scribe #1: I, Javier Lozano MD, personally performed the services described in this documentation, as scribed by Viktoria Gama, in my presence, and it is both accurate and complete.           Clinical Impression       ICD-10-CM ICD-9-CM   1. Sepsis, due to unspecified organism A41.9 038.9     995.91   2. Pyelonephritis N12 590.80   3. Recurrent UTI (urinary tract infection) N39.0 599.0       Disposition:   Disposition: Admitted  Condition: Fair         Javier Lozano MD  08/01/19 0425

## 2019-08-01 ENCOUNTER — TELEPHONE (OUTPATIENT)
Dept: UROLOGY | Facility: CLINIC | Age: 39
End: 2019-08-01

## 2019-08-01 PROBLEM — A41.9 SEPSIS: Status: ACTIVE | Noted: 2019-08-01

## 2019-08-01 LAB
ANION GAP SERPL CALC-SCNC: 7 MMOL/L (ref 8–16)
B-HCG UR QL: NEGATIVE
BASOPHILS # BLD AUTO: 0.01 K/UL (ref 0–0.2)
BASOPHILS NFR BLD: 0.1 % (ref 0–1.9)
BUN SERPL-MCNC: 8 MG/DL (ref 6–20)
CALCIUM SERPL-MCNC: 8.5 MG/DL (ref 8.7–10.5)
CHLORIDE SERPL-SCNC: 109 MMOL/L (ref 95–110)
CO2 SERPL-SCNC: 24 MMOL/L (ref 23–29)
CREAT SERPL-MCNC: 0.7 MG/DL (ref 0.5–1.4)
CRP SERPL-MCNC: 100.8 MG/L (ref 0–8.2)
DIFFERENTIAL METHOD: ABNORMAL
EOSINOPHIL # BLD AUTO: 0.2 K/UL (ref 0–0.5)
EOSINOPHIL NFR BLD: 1.4 % (ref 0–8)
ERYTHROCYTE [DISTWIDTH] IN BLOOD BY AUTOMATED COUNT: 12.4 % (ref 11.5–14.5)
EST. GFR  (AFRICAN AMERICAN): >60 ML/MIN/1.73 M^2
EST. GFR  (NON AFRICAN AMERICAN): >60 ML/MIN/1.73 M^2
GLUCOSE SERPL-MCNC: 82 MG/DL (ref 70–110)
HCT VFR BLD AUTO: 32.9 % (ref 37–48.5)
HGB BLD-MCNC: 11.1 G/DL (ref 12–16)
LYMPHOCYTES # BLD AUTO: 1.6 K/UL (ref 1–4.8)
LYMPHOCYTES NFR BLD: 14 % (ref 18–48)
MCH RBC QN AUTO: 32 PG (ref 27–31)
MCHC RBC AUTO-ENTMCNC: 33.7 G/DL (ref 32–36)
MCV RBC AUTO: 95 FL (ref 82–98)
MONOCYTES # BLD AUTO: 1.1 K/UL (ref 0.3–1)
MONOCYTES NFR BLD: 9.9 % (ref 4–15)
NEUTROPHILS # BLD AUTO: 8.3 K/UL (ref 1.8–7.7)
NEUTROPHILS NFR BLD: 74.9 % (ref 38–73)
PLATELET # BLD AUTO: 244 K/UL (ref 150–350)
PMV BLD AUTO: 10.3 FL (ref 9.2–12.9)
POTASSIUM SERPL-SCNC: 3.8 MMOL/L (ref 3.5–5.1)
RBC # BLD AUTO: 3.47 M/UL (ref 4–5.4)
SODIUM SERPL-SCNC: 140 MMOL/L (ref 136–145)
WBC # BLD AUTO: 11.06 K/UL (ref 3.9–12.7)

## 2019-08-01 PROCEDURE — 63600175 PHARM REV CODE 636 W HCPCS: Performed by: HOSPITALIST

## 2019-08-01 PROCEDURE — C1894 INTRO/SHEATH, NON-LASER: HCPCS

## 2019-08-01 PROCEDURE — 85025 COMPLETE CBC W/AUTO DIFF WBC: CPT

## 2019-08-01 PROCEDURE — 99244 PR OFFICE CONSULTATION,LEVEL IV: ICD-10-PCS | Mod: ,,, | Performed by: UROLOGY

## 2019-08-01 PROCEDURE — 80048 BASIC METABOLIC PNL TOTAL CA: CPT

## 2019-08-01 PROCEDURE — 96376 TX/PRO/DX INJ SAME DRUG ADON: CPT

## 2019-08-01 PROCEDURE — 63600175 PHARM REV CODE 636 W HCPCS: Performed by: EMERGENCY MEDICINE

## 2019-08-01 PROCEDURE — 25000003 PHARM REV CODE 250: Performed by: HOSPITALIST

## 2019-08-01 PROCEDURE — 25500020 PHARM REV CODE 255

## 2019-08-01 PROCEDURE — C1769 GUIDE WIRE: HCPCS

## 2019-08-01 PROCEDURE — 96375 TX/PRO/DX INJ NEW DRUG ADDON: CPT

## 2019-08-01 PROCEDURE — 86140 C-REACTIVE PROTEIN: CPT

## 2019-08-01 PROCEDURE — G0378 HOSPITAL OBSERVATION PER HR: HCPCS

## 2019-08-01 PROCEDURE — 99244 OFF/OP CNSLTJ NEW/EST MOD 40: CPT | Mod: ,,, | Performed by: UROLOGY

## 2019-08-01 PROCEDURE — 36415 COLL VENOUS BLD VENIPUNCTURE: CPT

## 2019-08-01 PROCEDURE — 63600175 PHARM REV CODE 636 W HCPCS

## 2019-08-01 RX ORDER — IMIPRAMINE HYDROCHLORIDE 25 MG/1
75 TABLET, FILM COATED ORAL NIGHTLY
Status: DISCONTINUED | OUTPATIENT
Start: 2019-08-01 | End: 2019-08-02 | Stop reason: HOSPADM

## 2019-08-01 RX ORDER — VANCOMYCIN HCL IN 5 % DEXTROSE 1G/250ML
1000 PLASTIC BAG, INJECTION (ML) INTRAVENOUS
Status: DISCONTINUED | OUTPATIENT
Start: 2019-08-01 | End: 2019-08-02 | Stop reason: HOSPADM

## 2019-08-01 RX ADMIN — VANCOMYCIN HYDROCHLORIDE 1000 MG: 1 INJECTION, POWDER, LYOPHILIZED, FOR SOLUTION INTRAVENOUS at 05:08

## 2019-08-01 RX ADMIN — MEROPENEM AND SODIUM CHLORIDE 500 MG: 500 INJECTION, SOLUTION INTRAVENOUS at 11:08

## 2019-08-01 RX ADMIN — FAMOTIDINE 20 MG: 20 TABLET ORAL at 08:08

## 2019-08-01 RX ADMIN — MEROPENEM AND SODIUM CHLORIDE 500 MG: 500 INJECTION, SOLUTION INTRAVENOUS at 05:08

## 2019-08-01 RX ADMIN — IMIPRAMINE HYDROCHLORIDE 75 MG: 25 TABLET ORAL at 08:08

## 2019-08-01 RX ADMIN — SODIUM CHLORIDE: 0.9 INJECTION, SOLUTION INTRAVENOUS at 11:08

## 2019-08-01 RX ADMIN — KETOROLAC TROMETHAMINE 15 MG: 30 INJECTION, SOLUTION INTRAMUSCULAR at 10:08

## 2019-08-01 RX ADMIN — KETOROLAC TROMETHAMINE 15 MG: 30 INJECTION, SOLUTION INTRAMUSCULAR at 11:08

## 2019-08-01 RX ADMIN — VANCOMYCIN HYDROCHLORIDE 2000 MG: 500 INJECTION, POWDER, LYOPHILIZED, FOR SOLUTION INTRAVENOUS at 01:08

## 2019-08-01 RX ADMIN — MEROPENEM AND SODIUM CHLORIDE 500 MG: 500 INJECTION, SOLUTION INTRAVENOUS at 01:08

## 2019-08-01 RX ADMIN — KETOROLAC TROMETHAMINE 15 MG: 30 INJECTION, SOLUTION INTRAMUSCULAR at 05:08

## 2019-08-01 RX ADMIN — IMIPRAMINE HYDROCHLORIDE 75 MG: 25 TABLET ORAL at 01:08

## 2019-08-01 NOTE — PROGRESS NOTES
"[facility] Hospital Medicine  Progress Note    Patient Name: Shannan Alford  MRN: [id number]  Patient Class: OP- Observation   Admission Date: 7/31/2019  Length of Stay: 1 days  Attending Physician: Pantera Dang MD  Primary Care Provider: Brent Nuñez MD        Subjective:     Principal Problem:Pyelonephritis      HPI:  38F h/o ADHD and recurrent UTIs presents with worsening "kidney infection".   Patient states she was diagnosed by her PCP on 6/17/19 and started on cipro and referred to outpatient urology.  Patient reports right flank pain has gotten worse even with abx and today came with high fever of 103.5F.  Patient has not seen urologist here in  yet. . She has had recurrent UTI every 6 weeks and had established care with urology in [place].   IPatient denies any chills, cough, SOB, abdominal pain, dysuria, hematuria, urinary frequency/urgency, vomiting, and all other sxs at this time. Patient states she last took Tylenol at noon.  In Er, WBC 16. UA hazy, many bacteria, wbc 40, 2+LE, negative nitrite.  CT abdomen/pelvis show bilateral nonobstructing renal calculi some with staghorn configuration, also no evidence of obstruction but right ureter demonstrates a hyperenhancing wall seen with infection.  Patient was given 1g IV CTX in ER. Urology (Rayna) consulted in ER and reports not emergency surgery needed tonight and will see patient in the morning.     Overview/Hospital Course:  As of 08/01  CT abdomen/pelvis with contrast showed bilateral nonobstructing renal calculi some with staghorn configuration.  There is no evidence of obstruction but the right ureter demonstrates a hyperenhancing wall which can be seen with infection  Pt currently receiving IV Vancomycin and Meropenum  Urology consulted -- discussed case with IR who will place right percutaneous nephroureteral catheter today  She will need to follow up with Dr. Way within 2 weeks after discharge for F/U    Interval " History: Pt seen and examined. Pt has no complaints at this time. Currently awaiting nephroureteral catheter placement.     Review of Systems   Constitutional: Positive for activity change. Negative for chills and fever.   HENT: Negative for trouble swallowing.    Eyes: Negative for visual disturbance.   Respiratory: Negative for apnea, cough, choking, chest tightness, shortness of breath, wheezing and stridor.    Cardiovascular: Negative for chest pain, palpitations and leg swelling.   Gastrointestinal: Negative for abdominal pain, constipation, diarrhea, nausea and vomiting.   Genitourinary: Positive for flank pain (right flank pain).   Musculoskeletal: Negative for arthralgias, back pain, gait problem, joint swelling, myalgias, neck pain and neck stiffness.   Skin: Negative for color change.   Neurological: Negative for dizziness, tremors, seizures, syncope, facial asymmetry, speech difficulty, light-headedness, numbness and headaches.   Psychiatric/Behavioral: Negative for agitation.     Objective:     Vital Signs (Most Recent):  Temp: 97.9 °F (36.6 °C) (08/01/19 1530)  Pulse: 91 (08/01/19 1707)  Resp: 17 (08/01/19 1707)  BP: 110/61 (08/01/19 1707)  SpO2: 98 % (08/01/19 1707) Vital Signs (24h Range):  Temp:  [97.2 °F (36.2 °C)-98.6 °F (37 °C)] 97.9 °F (36.6 °C)  Pulse:  [] 91  Resp:  [12-20] 17  SpO2:  [97 %-100 %] 98 %  BP: ()/(47-63) 110/61     Weight: 72.7 kg (160 lb 4.4 oz)  Body mass index is 28.39 kg/m².    Intake/Output Summary (Last 24 hours) at 8/1/2019 1735  Last data filed at 8/1/2019 1243  Gross per 24 hour   Intake 3653.5 ml   Output --   Net 3653.5 ml      Physical Exam   Constitutional: She is oriented to person, place, and time. She appears well-developed and well-nourished. She is cooperative. She is easily aroused. No distress.   HENT:   Head: Normocephalic and atraumatic.   Eyes: EOM are normal.   Neck: Normal range of motion. Neck supple.   Cardiovascular: Normal rate, regular  rhythm and intact distal pulses.   No murmur heard.  Pulmonary/Chest: Effort normal and breath sounds normal. No stridor. No respiratory distress. She has no wheezes. She has no rales. She exhibits no tenderness.   Abdominal: Soft. Bowel sounds are normal. She exhibits no distension. There is no tenderness. There is CVA tenderness (right flank). There is no rebound and no guarding.   Genitourinary:   Genitourinary Comments: Deferred   Musculoskeletal: Normal range of motion. She exhibits no edema, tenderness or deformity.   Neurological: She is alert, oriented to person, place, and time and easily aroused. No sensory deficit.   Skin: Skin is warm and dry. Capillary refill takes less than 2 seconds.   Psychiatric: She has a normal mood and affect. Her behavior is normal. Thought content normal.   Nursing note and vitals reviewed.      Significant Labs:   BMP:   Recent Labs   Lab 08/01/19  1209   GLU 82      K 3.8      CO2 24   BUN 8   CREATININE 0.7   CALCIUM 8.5*     CBC:   Recent Labs   Lab 07/31/19  1818 08/01/19  1209   WBC 16.16* 11.06   HGB 14.2 11.1*   HCT 40.9 32.9*    244       Significant Imaging: I have reviewed all pertinent imaging results/findings within the past 24 hours.      Assessment/Plan:      * Pyelonephritis  - prone to UTI due to history of nephrolithiasis  - infection failed cipro treatment  - CT abdomen/pelvis showing staghorn calculus  - start merrem 1g q8h/vanc for refractory vs recurrent UTI/pyelonephritis  - IVF hydration  - toradol prn pain  - keep NPO after MN    08/01  - Urology following -- discussed case with IR who will place right percutaneous nephroureteral catheter today  - Blood cultures NGTD  - Urine culture pending  - Continue IV Vancomycin and Meropenum  - Analgesics PRN  - F/U with Dr. Way within 2 weeks after discharge      Staghorn calculus  - seen on CT, likely cause of recurrent UTI  - follow up urology recs        VTE Risk Mitigation (From  admission, onward)        Ordered     IP VTE LOW RISK PATIENT  Once      07/31/19 2255     Place ROLAND hose  Until discontinued      07/31/19 2255     Place sequential compression device  Until discontinued      07/31/19 2255                SYLVESTER Mai  Department of Hospital Medicine   [facility]

## 2019-08-01 NOTE — H&P
"[facility] Hospital Medicine  History & Physical    Patient Name: Shannan Alford  MRN: [id number]  Admission Date: 7/31/2019  Attending Physician: Pantera Dang MD  Primary Care Provider: Brent Nuñez MD         Patient information was obtained from patient and ER records.     Subjective:     Principal Problem:Pyelonephritis    Chief Complaint:   Chief Complaint   Patient presents with    Pyelonephritis     pt states her PCP dx her with kidney infection, was on cipro and now sx are worse. Right flank pain and fever        HPI: 38F h/o ADHD and recurrent UTIs presents with worsening "kidney infection".   Patient states she was diagnosed by her PCP on 6/17/19 and started on cipro and referred to outpatient urology.  Patient reports right flank pain has gotten worse even with abx and today came with high fever of 103.5F.  Patient has not seen urologist here in  yet. . She has had recurrent UTI every 6 weeks and had established care with urology in [place].   IPatient denies any chills, cough, SOB, abdominal pain, dysuria, hematuria, urinary frequency/urgency, vomiting, and all other sxs at this time. Patient states she last took Tylenol at noon.  In Er, WBC 16. UA hazy, many bacteria, wbc 40, 2+LE, negative nitrite.  CT abdomen/pelvis show bilateral nonobstructing renal calculi some with staghorn configuration, also no evidence of obstruction but right ureter demonstrates a hyperenhancing wall seen with infection.  Patient was given 1g IV CTX in ER. Urology (Rayna) consulted in ER and reports not emergency surgery needed tonight and will see patient in the morning.     Past Medical History:   Diagnosis Date    ADHD (attention deficit hyperactivity disorder)     Depression        History reviewed. No pertinent surgical history.    Review of patient's allergies indicates:  No Known Allergies    No current facility-administered medications on file prior to encounter.      Current " Outpatient Medications on File Prior to Encounter   Medication Sig    desipramine (NORPRAMIN) 75 MG tablet Take 1 tablet (75 mg total) by mouth nightly.     Family History     Problem Relation (Age of Onset)    Hypertension Mother    No Known Problems Father        Tobacco Use    Smoking status: Never Smoker    Smokeless tobacco: Never Used   Substance and Sexual Activity    Alcohol use: Yes     Frequency: Monthly or less    Drug use: No    Sexual activity: Not on file     Review of Systems   Constitutional: Positive for fever. Negative for chills, diaphoresis, fatigue and unexpected weight change.   HENT: Negative for congestion, facial swelling, sore throat and trouble swallowing.    Eyes: Negative for photophobia, redness and visual disturbance.   Respiratory: Negative for apnea, cough, chest tightness, shortness of breath and wheezing.    Cardiovascular: Negative for chest pain, palpitations and leg swelling.   Gastrointestinal: Negative for abdominal distention, abdominal pain, blood in stool, constipation, diarrhea, nausea and vomiting.   Endocrine: Negative for polydipsia, polyphagia and polyuria.   Genitourinary: Positive for flank pain. Negative for difficulty urinating, dysuria, frequency, hematuria and urgency.   Musculoskeletal: Negative for arthralgias, back pain, joint swelling, myalgias and neck stiffness.   Skin: Negative for pallor and rash.   Allergic/Immunologic: Negative for immunocompromised state.   Neurological: Negative for dizziness, tremors, syncope, weakness, light-headedness and headaches.   Psychiatric/Behavioral: Negative for agitation, confusion and suicidal ideas.   All other systems reviewed and are negative.    Objective:     Vital Signs (Most Recent):  Temp: 98.5 °F (36.9 °C) (07/31/19 2300)  Pulse: 82 (07/31/19 2300)  Resp: 16 (07/31/19 2300)  BP: 109/63 (07/31/19 2300)  SpO2: 98 % (07/31/19 2300) Vital Signs (24h Range):  Temp:  [98.5 °F (36.9 °C)-101.6 °F (38.7 °C)] 98.5  °F (36.9 °C)  Pulse:  [] 82  Resp:  [16-18] 16  SpO2:  [98 %-100 %] 98 %  BP: (106-122)/(52-71) 109/63     Weight: 72.7 kg (160 lb 4.4 oz)  Body mass index is 28.39 kg/m².    Physical Exam   Constitutional: She is oriented to person, place, and time. She appears well-developed and well-nourished. No distress.   HENT:   Head: Normocephalic and atraumatic.   Eyes: Pupils are equal, round, and reactive to light. Conjunctivae and EOM are normal. No scleral icterus.   Neck: Normal range of motion. Neck supple. No JVD present. No thyromegaly present.   Cardiovascular: Normal rate, regular rhythm and intact distal pulses. Exam reveals no gallop and no friction rub.   No murmur heard.  Pulmonary/Chest: Effort normal and breath sounds normal. No respiratory distress. She has no wheezes. She has no rales. She exhibits no tenderness.   Abdominal: Soft. Bowel sounds are normal. She exhibits no distension and no mass. There is no tenderness. There is no guarding.   CVA tenderness   Musculoskeletal: Normal range of motion. She exhibits no tenderness.   Neurological: She is alert and oriented to person, place, and time. No cranial nerve deficit.   Skin: Skin is warm and dry. Capillary refill takes less than 2 seconds. No rash noted. She is not diaphoretic. No erythema.   Psychiatric: She has a normal mood and affect.   Nursing note and vitals reviewed.        CRANIAL NERVES     CN III, IV, VI   Pupils are equal, round, and reactive to light.  Extraocular motions are normal.        Significant Labs:   CBC:   Recent Labs   Lab 07/31/19  1818   WBC 16.16*   HGB 14.2   HCT 40.9        CMP:   Recent Labs   Lab 07/31/19  1818      K 3.6      CO2 27   GLU 93   BUN 9   CREATININE 0.9   CALCIUM 9.8   PROT 8.2   ALBUMIN 4.1   BILITOT 0.5   ALKPHOS 92   AST 12   ALT 10   ANIONGAP 10   EGFRNONAA >60     Urine Studies:   Recent Labs   Lab 07/31/19  1738   COLORU Yellow   APPEARANCEUA Hazy*   PHUR 7.0   SPECGRAV 1.010    PROTEINUA Negative   GLUCUA Negative   KETONESU Negative   BILIRUBINUA Negative   OCCULTUA 2+*   NITRITE Negative   UROBILINOGEN Negative   LEUKOCYTESUR 2+*   RBCUA 0   WBCUA 40*   BACTERIA Many*   SQUAMEPITHEL 3     All pertinent labs within the past 24 hours have been reviewed.    Significant Imaging: I have reviewed all pertinent imaging results/findings within the past 24 hours.   Imaging Results          CT Abdomen Pelvis With Contrast (Final result)  Result time 07/31/19 19:42:10    Final result by Isabelle Roa MD (07/31/19 19:42:10)                 Impression:      Bilateral nonobstructing renal calculi some with staghorn configuration.  There is no evidence of obstruction but the right ureter demonstrates a hyperenhancing wall which can be seen with infection.    Simple appearing bilateral renal cysts.    All CT scans at this facility use dose modulation, iterative reconstruction and/or weight based dosing when appropriate to reduce radiation dose to as low as reasonably achievable.      Electronically signed by: Isabelle Roa  Date:    07/31/2019  Time:    19:42             Narrative:    EXAMINATION:  CT ABDOMEN PELVIS WITH CONTRAST    CLINICAL HISTORY:  Infection, abdomen-pelvis;    TECHNIQUE:  Axial CT imaging was performed through the abdomen and pelvis with intravenous contrast. Multiplanar reformats were performed and interpreted.    COMPARISON:  None    FINDINGS:  Lung bases are clear.    There are multiple bilateral nonobstructing renal calculi with staghorn configuration.  The right ureter is minimally dilated with a hyperenhancing wall.  There are bilateral simple appearing renal cysts.  The liver, spleen,, adrenal glands, and pancreas are normal.    The gallbladder is unremarkable.    No free fluid, free air, or inflammatory change.    The bowel is nondistended and within normal limits.    The abdominal aorta is normal in caliber.    The urinary bladder is unremarkable.    No  significant osseous abnormality is identified.                                  Assessment/Plan:     * Pyelonephritis  - prone to UTI due to history of nephrolithiasis  - infection failed cipro treatment  - CT abdomen/pelvis showing staghorn calculus  - start merrem 1g q8h/vanc for refractory vs recurrent UTI/pyelonephritis  - IVF hydration  - toradol prn pain  - keep NPO after MN      Staghorn calculus  - seen on CT, likely cause of recurrent UTI  - follow up urology recs        VTE Risk Mitigation (From admission, onward)        Ordered     IP VTE LOW RISK PATIENT  Once      07/31/19 2255     Place ROLAND hose  Until discontinued      07/31/19 2255     Place sequential compression device  Until discontinued      07/31/19 2255             Pantera Dang MD  Department of Hospital Medicine   [facility]

## 2019-08-01 NOTE — INTERVAL H&P NOTE
The patient has been examined and the H&P has been reviewed:    I concur with the findings and no changes have occurred since H&P was written.    Anesthesia/Surgery risks, benefits and alternative options discussed and understood by patient/family.          Active Hospital Problems    Diagnosis  POA    *Pyelonephritis [N12]  Yes    Sepsis [A41.9]  Yes    Staghorn calculus [N20.0]  Yes      Resolved Hospital Problems   No resolved problems to display.

## 2019-08-01 NOTE — H&P (VIEW-ONLY)
Chief Complaint: Right staghorn calculus; pyelonephritis    HPI:   8/1/19: 39 yo woman since age 18 has had trouble with renal stones, and has had multiple ESWL/URS for same, mostly on the right side.  Last imaging 10 years ago while pregnant and no interval followup.  Has had a UTI for some months now without resolution.  Referred to ER by PCP after presentation with 103+ fever.  Feeling much better now.  Consistent right flank pain.  No other abd/pelvic pain and no exac/rel factors.  No hematuria.  No urinary bother.  Normal sexual function.  CT shows bilateral renal stones; on right has multiple blunted and chronically atrophied calyces with a staghorn likely obstructive in those areas.  No cris abscess but suggestive of pyelonephritis with chronic obstruction, partial XGP?    Allergies:  Patient has no known allergies.    Medications:  See chart    Review of Systems:  General: No fever, chills, fatigability, or weight loss.  Skin: No rashes, itching, or changes in color or texture of skin.  Chest: Denies ALVAREZ, cyanosis, wheezing, cough, and sputum production.  Abdomen: Appetite fine. No weight loss. Denies diarrhea, abdominal pain, hematemesis, or blood in stool.  Musculoskeletal: No joint stiffness or swelling. Denies back pain.  : As above.  All other review of systems negative.    PMH:   has a past medical history of ADHD (attention deficit hyperactivity disorder) and Depression.    PSH:   has no past surgical history on file. ESWL/URS.    FamHx: family history includes Hypertension in her mother; No Known Problems in her father.    SocHx:  reports that she has never smoked. She has never used smokeless tobacco. She reports that she drinks alcohol. She reports that she does not use drugs.      Physical Exam:  Vitals:    08/01/19 0721   BP: (!) 99/55   Pulse: 80   Resp: 18   Temp: 97.7 °F (36.5 °C)     General: A&Ox3, no apparent distress, no deformities  Neck: No masses, normal thyroid  Lungs: normal  inspiration, no use of accessory muscles  Heart: normal pulse, no arrhythmias  Abdomen: Soft, NT, ND, no masses, no hernias, no hepatosplenomegaly  Lymphatic: Neck and groin nodes negative  Skin: The skin is warm and dry. No jaundice.  Ext: No c/c/e.  : CVA tenderness    Labs/Studies: see chart    Impression/Plan:   1. Reviewed with interventional radiologist, with plan for right nephroureterostomy to facilitate later PCNL in coming weeks.  2. Appropriate Abx; will see in office in 10d to discuss next steps.

## 2019-08-01 NOTE — PROGRESS NOTES
Pharmacokinetic Initial Assessment: IV Vancomycin    Assessment/Plan:    Initiate intravenous vancomycin with loading dose of 2000 mg once followed by a maintenance dose of vancomycin 1000mg IV every 12 hours  Desired empiric serum trough concentration is 10 to 20 mcg/mL.  Draw vancomycin trough level 30 min prior to fourth dose on 8/2 at approximately 13:00   Pharmacy will continue to follow and monitor vancomycin.      Please contact pharmacy at extension [phone] with any questions regarding this assessment.     Thank you for the consult,   Luisito Mullins     Patient brief summary:  Shannan Alford is a 38 y.o. female initiated on antimicrobial therapy with IV Vancomycin for treatment of suspected urinary tract infection    Drug Allergies:   Review of patient's allergies indicates:  No Known Allergies    Actual Body Weight:   72.7kg    Renal Function:   Estimated Creatinine Clearance: 82.5 mL/min (based on SCr of 0.9 mg/dL).,     Dialysis Method (if applicable):  N/A     CBC (last 72 hours):  Recent Labs   Lab Result Units 07/31/19  1818   WBC K/uL 16.16*   Hemoglobin g/dL 14.2   Hematocrit % 40.9   Platelets K/uL 305   Gran% % 76.6*   Lymph% % 13.0*   Mono% % 10.5   Eosinophil% % 0.1   Basophil% % 0.1   Differential Method  Automated       Metabolic Panel (last 72 hours):  Recent Labs   Lab Result Units 07/31/19  1738 07/31/19  1818   Sodium mmol/L  --  137   Potassium mmol/L  --  3.6   Chloride mmol/L  --  100   CO2 mmol/L  --  27   Glucose mg/dL  --  93   Glucose, UA  Negative  --    BUN, Bld mg/dL  --  9   Creatinine mg/dL  --  0.9   Albumin g/dL  --  4.1   Total Bilirubin mg/dL  --  0.5   Alkaline Phosphatase U/L  --  92   AST U/L  --  12   ALT U/L  --  10       Drug levels (last 3 results):  No results for input(s): VANCOMYCINRA, VANCOMYCINPE, VANCOMYCINTR in the last 72 hours.    Microbiologic Results:  Microbiology Results (last 7 days)       Procedure Component Value Units Date/Time    Blood culture x  two cultures. Draw prior to antibiotics. [[id number]] Collected:  07/31/19 1818    Order Status:  Sent Specimen:  Blood from Peripheral, Antecubital, Right Updated:  08/01/19 0132    Blood culture x two cultures. Draw prior to antibiotics. [[id number]] Collected:  07/31/19 1909    Order Status:  Sent Specimen:  Blood from Peripheral, Antecubital, Right Updated:  08/01/19 0132    Urine culture [[id number]] Collected:  07/31/19 1738    Order Status:  No result Specimen:  Urine Updated:  07/31/19 1957

## 2019-08-01 NOTE — ASSESSMENT & PLAN NOTE
- prone to UTI due to history of nephrolithiasis  - infection failed cipro treatment  - CT abdomen/pelvis showing staghorn calculus  - start merrem 1g q8h/vanc for refractory vs recurrent UTI/pyelonephritis  - IVF hydration  - toradol prn pain  - keep NPO after MN

## 2019-08-01 NOTE — HOSPITAL COURSE
The patient presented with pyelonephritis as well as nonobstructing stones bilaterally. She was treated with IV antibiotics. Urology was consulted and recommended right percutaneous nephroureteral catheter placement which was performed by Dr. Rodrigues on 8/1/19. The patient will be discharged to complete a course of antibiotics and follow up with Dr. Way in 10 days.

## 2019-08-01 NOTE — SUBJECTIVE & OBJECTIVE
Past Medical History:   Diagnosis Date    ADHD (attention deficit hyperactivity disorder)     Depression        History reviewed. No pertinent surgical history.    Review of patient's allergies indicates:  No Known Allergies    No current facility-administered medications on file prior to encounter.      Current Outpatient Medications on File Prior to Encounter   Medication Sig    desipramine (NORPRAMIN) 75 MG tablet Take 1 tablet (75 mg total) by mouth nightly.     Family History     Problem Relation (Age of Onset)    Hypertension Mother    No Known Problems Father        Tobacco Use    Smoking status: Never Smoker    Smokeless tobacco: Never Used   Substance and Sexual Activity    Alcohol use: Yes     Frequency: Monthly or less    Drug use: No    Sexual activity: Not on file     Review of Systems   Constitutional: Positive for fever. Negative for chills, diaphoresis, fatigue and unexpected weight change.   HENT: Negative for congestion, facial swelling, sore throat and trouble swallowing.    Eyes: Negative for photophobia, redness and visual disturbance.   Respiratory: Negative for apnea, cough, chest tightness, shortness of breath and wheezing.    Cardiovascular: Negative for chest pain, palpitations and leg swelling.   Gastrointestinal: Negative for abdominal distention, abdominal pain, blood in stool, constipation, diarrhea, nausea and vomiting.   Endocrine: Negative for polydipsia, polyphagia and polyuria.   Genitourinary: Positive for flank pain. Negative for difficulty urinating, dysuria, frequency, hematuria and urgency.   Musculoskeletal: Negative for arthralgias, back pain, joint swelling, myalgias and neck stiffness.   Skin: Negative for pallor and rash.   Allergic/Immunologic: Negative for immunocompromised state.   Neurological: Negative for dizziness, tremors, syncope, weakness, light-headedness and headaches.   Psychiatric/Behavioral: Negative for agitation, confusion and suicidal ideas.    All other systems reviewed and are negative.    Objective:     Vital Signs (Most Recent):  Temp: 98.5 °F (36.9 °C) (07/31/19 2300)  Pulse: 82 (07/31/19 2300)  Resp: 16 (07/31/19 2300)  BP: 109/63 (07/31/19 2300)  SpO2: 98 % (07/31/19 2300) Vital Signs (24h Range):  Temp:  [98.5 °F (36.9 °C)-101.6 °F (38.7 °C)] 98.5 °F (36.9 °C)  Pulse:  [] 82  Resp:  [16-18] 16  SpO2:  [98 %-100 %] 98 %  BP: (106-122)/(52-71) 109/63     Weight: 72.7 kg (160 lb 4.4 oz)  Body mass index is 28.39 kg/m².    Physical Exam   Constitutional: She is oriented to person, place, and time. She appears well-developed and well-nourished. No distress.   HENT:   Head: Normocephalic and atraumatic.   Eyes: Pupils are equal, round, and reactive to light. Conjunctivae and EOM are normal. No scleral icterus.   Neck: Normal range of motion. Neck supple. No JVD present. No thyromegaly present.   Cardiovascular: Normal rate, regular rhythm and intact distal pulses. Exam reveals no gallop and no friction rub.   No murmur heard.  Pulmonary/Chest: Effort normal and breath sounds normal. No respiratory distress. She has no wheezes. She has no rales. She exhibits no tenderness.   Abdominal: Soft. Bowel sounds are normal. She exhibits no distension and no mass. There is no tenderness. There is no guarding.   CVA tenderness   Musculoskeletal: Normal range of motion. She exhibits no tenderness.   Neurological: She is alert and oriented to person, place, and time. No cranial nerve deficit.   Skin: Skin is warm and dry. Capillary refill takes less than 2 seconds. No rash noted. She is not diaphoretic. No erythema.   Psychiatric: She has a normal mood and affect.   Nursing note and vitals reviewed.        CRANIAL NERVES     CN III, IV, VI   Pupils are equal, round, and reactive to light.  Extraocular motions are normal.        Significant Labs:   CBC:   Recent Labs   Lab 07/31/19  1818   WBC 16.16*   HGB 14.2   HCT 40.9        CMP:   Recent Labs   Lab  07/31/19  1818      K 3.6      CO2 27   GLU 93   BUN 9   CREATININE 0.9   CALCIUM 9.8   PROT 8.2   ALBUMIN 4.1   BILITOT 0.5   ALKPHOS 92   AST 12   ALT 10   ANIONGAP 10   EGFRNONAA >60     Urine Studies:   Recent Labs   Lab 07/31/19  1738   COLORU Yellow   APPEARANCEUA Hazy*   PHUR 7.0   SPECGRAV 1.010   PROTEINUA Negative   GLUCUA Negative   KETONESU Negative   BILIRUBINUA Negative   OCCULTUA 2+*   NITRITE Negative   UROBILINOGEN Negative   LEUKOCYTESUR 2+*   RBCUA 0   WBCUA 40*   BACTERIA Many*   SQUAMEPITHEL 3     All pertinent labs within the past 24 hours have been reviewed.    Significant Imaging: I have reviewed all pertinent imaging results/findings within the past 24 hours.   Imaging Results          CT Abdomen Pelvis With Contrast (Final result)  Result time 07/31/19 19:42:10    Final result by Isabelle Roa MD (07/31/19 19:42:10)                 Impression:      Bilateral nonobstructing renal calculi some with staghorn configuration.  There is no evidence of obstruction but the right ureter demonstrates a hyperenhancing wall which can be seen with infection.    Simple appearing bilateral renal cysts.    All CT scans at this facility use dose modulation, iterative reconstruction and/or weight based dosing when appropriate to reduce radiation dose to as low as reasonably achievable.      Electronically signed by: Isabelle Roa  Date:    07/31/2019  Time:    19:42             Narrative:    EXAMINATION:  CT ABDOMEN PELVIS WITH CONTRAST    CLINICAL HISTORY:  Infection, abdomen-pelvis;    TECHNIQUE:  Axial CT imaging was performed through the abdomen and pelvis with intravenous contrast. Multiplanar reformats were performed and interpreted.    COMPARISON:  None    FINDINGS:  Lung bases are clear.    There are multiple bilateral nonobstructing renal calculi with staghorn configuration.  The right ureter is minimally dilated with a hyperenhancing wall.  There are bilateral simple  appearing renal cysts.  The liver, spleen,, adrenal glands, and pancreas are normal.    The gallbladder is unremarkable.    No free fluid, free air, or inflammatory change.    The bowel is nondistended and within normal limits.    The abdominal aorta is normal in caliber.    The urinary bladder is unremarkable.    No significant osseous abnormality is identified.

## 2019-08-01 NOTE — NURSING TRANSFER
Nursing Transfer Note      8/1/2019     Transfer 570    Transfer via BED    Transfer with BELONGINGS    Transported by JENNY GERONIMO RN    Medicines sent: NONE    Chart send with patient: YES    Notified:FAMILY WITH PATIENT    Patient reassessed at: 8/1/19  4792  REPORT TO BLAIR    Upon arrival to floor: TRANSFERRED TO ROOM. TRANSFERRED TO BED.  TELEMETRY MONITER ON.  IV FLUIDS ON PUMP AT PRESCRIBED RATE.  PROCEDURAL ACCESS SITE WITHOUT BLEEDING OR HEMATOMA.  DRESSING DRY AND INTACT.  CARE HANDED OFF TO..BLAIR........

## 2019-08-01 NOTE — PLAN OF CARE
Problem: Adult Inpatient Plan of Care  Goal: Plan of Care Review  Outcome: Ongoing (interventions implemented as appropriate)  Received pt from the ED with complaints of flank pain. Oriented pt to unit, hourly rounding and POC reviewed. Pt verbalized understanding no concerns at the moment.  VSS. Iv fluids and ABx infusing per orders. Pain adequately controlled.  NPO status maintained. Safety precautions implemented. Chart reviewed. Will continue to monitor.

## 2019-08-01 NOTE — ASSESSMENT & PLAN NOTE
- prone to UTI due to history of nephrolithiasis  - infection failed cipro treatment  - CT abdomen/pelvis showing staghorn calculus  - start merrem 1g q8h/vanc for refractory vs recurrent UTI/pyelonephritis  - IVF hydration  - toradol prn pain  - keep NPO after MN    08/01  - Urology following -- discussed case with IR who will place right percutaneous nephroureteral catheter today  - Blood cultures NGTD  - Urine culture pending  - Continue IV Vancomycin and Meropenum  - Analgesics PRN  - F/U with Dr. Way within 2 weeks after discharge

## 2019-08-01 NOTE — PROCEDURES
Radiology Post-Procedure Note    Pre Op Diagnosis: right hydronephrosis with staghorn    Post Op Diagnosis: same    Procedure:right percutaneous nephroureteral catheter    Procedure performed by: Ravi PINZON, Zach    Written Informed Consent Obtained: Yes    Specimen Removed: NO    Estimated Blood Loss: less than 100     Findings: Local anesthesia and moderate sedation were used.      Fluoroscopy was used to localize the right collecting system and a percutaneous Nephu catheter was introduced.  Position of the catheter in the collecting system down to the bladderwas confirmed using injection of iodinated contrast.      The patient tolerated the procedure well and there were no complications.  Please see Imaging report for further details.    Zach Rodrigues MD  Staff Radiologist  Department of Radiology  Pager: [phone]

## 2019-08-01 NOTE — SUBJECTIVE & OBJECTIVE
Interval History: Pt seen and examined. Pt has no complaints at this time. Currently awaiting nephroureteral catheter placement.     Review of Systems   Constitutional: Positive for activity change. Negative for chills and fever.   HENT: Negative for trouble swallowing.    Eyes: Negative for visual disturbance.   Respiratory: Negative for apnea, cough, choking, chest tightness, shortness of breath, wheezing and stridor.    Cardiovascular: Negative for chest pain, palpitations and leg swelling.   Gastrointestinal: Negative for abdominal pain, constipation, diarrhea, nausea and vomiting.   Genitourinary: Positive for flank pain (right flank pain).   Musculoskeletal: Negative for arthralgias, back pain, gait problem, joint swelling, myalgias, neck pain and neck stiffness.   Skin: Negative for color change.   Neurological: Negative for dizziness, tremors, seizures, syncope, facial asymmetry, speech difficulty, light-headedness, numbness and headaches.   Psychiatric/Behavioral: Negative for agitation.     Objective:     Vital Signs (Most Recent):  Temp: 97.9 °F (36.6 °C) (08/01/19 1530)  Pulse: 91 (08/01/19 1707)  Resp: 17 (08/01/19 1707)  BP: 110/61 (08/01/19 1707)  SpO2: 98 % (08/01/19 1707) Vital Signs (24h Range):  Temp:  [97.2 °F (36.2 °C)-98.6 °F (37 °C)] 97.9 °F (36.6 °C)  Pulse:  [] 91  Resp:  [12-20] 17  SpO2:  [97 %-100 %] 98 %  BP: ()/(47-63) 110/61     Weight: 72.7 kg (160 lb 4.4 oz)  Body mass index is 28.39 kg/m².    Intake/Output Summary (Last 24 hours) at 8/1/2019 1735  Last data filed at 8/1/2019 1243  Gross per 24 hour   Intake 3653.5 ml   Output --   Net 3653.5 ml      Physical Exam   Constitutional: She is oriented to person, place, and time. She appears well-developed and well-nourished. She is cooperative. She is easily aroused. No distress.   HENT:   Head: Normocephalic and atraumatic.   Eyes: EOM are normal.   Neck: Normal range of motion. Neck supple.   Cardiovascular: Normal rate,  regular rhythm and intact distal pulses.   No murmur heard.  Pulmonary/Chest: Effort normal and breath sounds normal. No stridor. No respiratory distress. She has no wheezes. She has no rales. She exhibits no tenderness.   Abdominal: Soft. Bowel sounds are normal. She exhibits no distension. There is no tenderness. There is CVA tenderness (right flank). There is no rebound and no guarding.   Genitourinary:   Genitourinary Comments: Deferred   Musculoskeletal: Normal range of motion. She exhibits no edema, tenderness or deformity.   Neurological: She is alert, oriented to person, place, and time and easily aroused. No sensory deficit.   Skin: Skin is warm and dry. Capillary refill takes less than 2 seconds.   Psychiatric: She has a normal mood and affect. Her behavior is normal. Thought content normal.   Nursing note and vitals reviewed.      Significant Labs:   BMP:   Recent Labs   Lab 08/01/19  1209   GLU 82      K 3.8      CO2 24   BUN 8   CREATININE 0.7   CALCIUM 8.5*     CBC:   Recent Labs   Lab 07/31/19  1818 08/01/19  1209   WBC 16.16* 11.06   HGB 14.2 11.1*   HCT 40.9 32.9*    244       Significant Imaging: I have reviewed all pertinent imaging results/findings within the past 24 hours.

## 2019-08-01 NOTE — ED NOTES
Patient sitting up in bed, no acute distress noted, awake, alert, and oriented x 3, calm, respirations even and unlabored, and skin is warm and dry. Patient verbalized understanding of status and plan of care. Patient denies needs at this time. Side rails up x 2, call light in reach, bed low and locked. Will continue to monitor.

## 2019-08-01 NOTE — CONSULTS
Chief Complaint: Right staghorn calculus; pyelonephritis    HPI:   8/1/19: 37 yo woman since age 18 has had trouble with renal stones, and has had multiple ESWL/URS for same, mostly on the right side.  Last imaging 10 years ago while pregnant and no interval followup.  Has had a UTI for some months now without resolution.  Referred to ER by PCP after presentation with 103+ fever.  Feeling much better now.  Consistent right flank pain.  No other abd/pelvic pain and no exac/rel factors.  No hematuria.  No urinary bother.  Normal sexual function.  CT shows bilateral renal stones; on right has multiple blunted and chronically atrophied calyces with a staghorn likely obstructive in those areas.  No cris abscess but suggestive of pyelonephritis with chronic obstruction, partial XGP?    Allergies:  Patient has no known allergies.    Medications:  See chart    Review of Systems:  General: No fever, chills, fatigability, or weight loss.  Skin: No rashes, itching, or changes in color or texture of skin.  Chest: Denies ALVAREZ, cyanosis, wheezing, cough, and sputum production.  Abdomen: Appetite fine. No weight loss. Denies diarrhea, abdominal pain, hematemesis, or blood in stool.  Musculoskeletal: No joint stiffness or swelling. Denies back pain.  : As above.  All other review of systems negative.    PMH:   has a past medical history of ADHD (attention deficit hyperactivity disorder) and Depression.    PSH:   has no past surgical history on file. ESWL/URS.    FamHx: family history includes Hypertension in her mother; No Known Problems in her father.    SocHx:  reports that she has never smoked. She has never used smokeless tobacco. She reports that she drinks alcohol. She reports that she does not use drugs.      Physical Exam:  Vitals:    08/01/19 0721   BP: (!) 99/55   Pulse: 80   Resp: 18   Temp: 97.7 °F (36.5 °C)     General: A&Ox3, no apparent distress, no deformities  Neck: No masses, normal thyroid  Lungs: normal  inspiration, no use of accessory muscles  Heart: normal pulse, no arrhythmias  Abdomen: Soft, NT, ND, no masses, no hernias, no hepatosplenomegaly  Lymphatic: Neck and groin nodes negative  Skin: The skin is warm and dry. No jaundice.  Ext: No c/c/e.  : CVA tenderness    Labs/Studies: see chart    Impression/Plan:   1. Reviewed with interventional radiologist, with plan for right nephroureterostomy to facilitate later PCNL in coming weeks.  2. Appropriate Abx; will see in office in 10d to discuss next steps.

## 2019-08-01 NOTE — PLAN OF CARE
Problem: Adult Inpatient Plan of Care  Goal: Plan of Care Review  Outcome: Ongoing (interventions implemented as appropriate)  Fall precautions maintained. Pt free from falls/injuries.  Patient complains of pain. Pain controlled with prn meds.  Antibiotics given as prescribed.  Ambulates and repositions independently.   Accucheck done, coverage given as needed.  Plan of care and medications discussed with patient.  Patient verbalized understanding.  Bed locked and low, call bell within reach.  Chart check done. Will continue to monitor.

## 2019-08-02 ENCOUNTER — NURSE TRIAGE (OUTPATIENT)
Dept: ADMINISTRATIVE | Facility: CLINIC | Age: 39
End: 2019-08-02

## 2019-08-02 ENCOUNTER — TELEPHONE (OUTPATIENT)
Dept: INTERNAL MEDICINE | Facility: CLINIC | Age: 39
End: 2019-08-02

## 2019-08-02 VITALS
WEIGHT: 160.25 LBS | HEIGHT: 63 IN | SYSTOLIC BLOOD PRESSURE: 114 MMHG | OXYGEN SATURATION: 95 % | BODY MASS INDEX: 28.39 KG/M2 | HEART RATE: 104 BPM | RESPIRATION RATE: 17 BRPM | TEMPERATURE: 99 F | DIASTOLIC BLOOD PRESSURE: 57 MMHG

## 2019-08-02 PROBLEM — A41.9 SEPSIS: Status: RESOLVED | Noted: 2019-08-01 | Resolved: 2019-08-02

## 2019-08-02 LAB
ALBUMIN SERPL BCP-MCNC: 2.6 G/DL (ref 3.5–5.2)
ALP SERPL-CCNC: 75 U/L (ref 55–135)
ALT SERPL W/O P-5'-P-CCNC: 14 U/L (ref 10–44)
ANION GAP SERPL CALC-SCNC: 4 MMOL/L (ref 8–16)
AST SERPL-CCNC: 16 U/L (ref 10–40)
BASOPHILS # BLD AUTO: 0.01 K/UL (ref 0–0.2)
BASOPHILS NFR BLD: 0.1 % (ref 0–1.9)
BILIRUB SERPL-MCNC: 0.3 MG/DL (ref 0.1–1)
BUN SERPL-MCNC: 13 MG/DL (ref 6–20)
CALCIUM SERPL-MCNC: 8.4 MG/DL (ref 8.7–10.5)
CHLORIDE SERPL-SCNC: 106 MMOL/L (ref 95–110)
CO2 SERPL-SCNC: 26 MMOL/L (ref 23–29)
CREAT SERPL-MCNC: 0.8 MG/DL (ref 0.5–1.4)
DIFFERENTIAL METHOD: ABNORMAL
EOSINOPHIL # BLD AUTO: 0.1 K/UL (ref 0–0.5)
EOSINOPHIL NFR BLD: 1 % (ref 0–8)
ERYTHROCYTE [DISTWIDTH] IN BLOOD BY AUTOMATED COUNT: 12.4 % (ref 11.5–14.5)
EST. GFR  (AFRICAN AMERICAN): >60 ML/MIN/1.73 M^2
EST. GFR  (NON AFRICAN AMERICAN): >60 ML/MIN/1.73 M^2
GLUCOSE SERPL-MCNC: 103 MG/DL (ref 70–110)
HCT VFR BLD AUTO: 31.8 % (ref 37–48.5)
HGB BLD-MCNC: 10.5 G/DL (ref 12–16)
LYMPHOCYTES # BLD AUTO: 1.4 K/UL (ref 1–4.8)
LYMPHOCYTES NFR BLD: 10.2 % (ref 18–48)
MCH RBC QN AUTO: 31.7 PG (ref 27–31)
MCHC RBC AUTO-ENTMCNC: 33 G/DL (ref 32–36)
MCV RBC AUTO: 96 FL (ref 82–98)
MONOCYTES # BLD AUTO: 0.9 K/UL (ref 0.3–1)
MONOCYTES NFR BLD: 6.8 % (ref 4–15)
NEUTROPHILS # BLD AUTO: 11.2 K/UL (ref 1.8–7.7)
NEUTROPHILS NFR BLD: 82.2 % (ref 38–73)
PLATELET # BLD AUTO: 247 K/UL (ref 150–350)
PMV BLD AUTO: 10.6 FL (ref 9.2–12.9)
POTASSIUM SERPL-SCNC: 4.1 MMOL/L (ref 3.5–5.1)
PROT SERPL-MCNC: 5.6 G/DL (ref 6–8.4)
RBC # BLD AUTO: 3.31 M/UL (ref 4–5.4)
SODIUM SERPL-SCNC: 136 MMOL/L (ref 136–145)
WBC # BLD AUTO: 13.66 K/UL (ref 3.9–12.7)

## 2019-08-02 PROCEDURE — 80053 COMPREHEN METABOLIC PANEL: CPT

## 2019-08-02 PROCEDURE — 96376 TX/PRO/DX INJ SAME DRUG ADON: CPT

## 2019-08-02 PROCEDURE — G0378 HOSPITAL OBSERVATION PER HR: HCPCS

## 2019-08-02 PROCEDURE — 36415 COLL VENOUS BLD VENIPUNCTURE: CPT

## 2019-08-02 PROCEDURE — 25000003 PHARM REV CODE 250: Performed by: HOSPITALIST

## 2019-08-02 PROCEDURE — 96375 TX/PRO/DX INJ NEW DRUG ADDON: CPT

## 2019-08-02 PROCEDURE — 85025 COMPLETE CBC W/AUTO DIFF WBC: CPT

## 2019-08-02 PROCEDURE — 63600175 PHARM REV CODE 636 W HCPCS: Performed by: HOSPITALIST

## 2019-08-02 RX ORDER — AMOXICILLIN AND CLAVULANATE POTASSIUM 875; 125 MG/1; MG/1
1 TABLET, FILM COATED ORAL EVERY 12 HOURS
Qty: 20 TABLET | Refills: 0 | Status: SHIPPED | OUTPATIENT
Start: 2019-08-02 | End: 2019-08-12

## 2019-08-02 RX ORDER — DESIPRAMINE HYDROCHLORIDE 75 MG/1
75 TABLET ORAL DAILY
Qty: 30 TABLET | Refills: 0
Start: 2019-08-02 | End: 2019-08-06 | Stop reason: SDUPTHER

## 2019-08-02 RX ADMIN — ONDANSETRON 4 MG: 2 INJECTION INTRAMUSCULAR; INTRAVENOUS at 12:08

## 2019-08-02 RX ADMIN — PROMETHAZINE HYDROCHLORIDE 6.25 MG: 25 INJECTION INTRAMUSCULAR; INTRAVENOUS at 06:08

## 2019-08-02 RX ADMIN — KETOROLAC TROMETHAMINE 15 MG: 30 INJECTION, SOLUTION INTRAMUSCULAR at 06:08

## 2019-08-02 RX ADMIN — VANCOMYCIN HYDROCHLORIDE 1000 MG: 1 INJECTION, POWDER, LYOPHILIZED, FOR SOLUTION INTRAVENOUS at 02:08

## 2019-08-02 RX ADMIN — MEROPENEM AND SODIUM CHLORIDE 500 MG: 500 INJECTION, SOLUTION INTRAVENOUS at 07:08

## 2019-08-02 RX ADMIN — FAMOTIDINE 20 MG: 20 TABLET ORAL at 08:08

## 2019-08-02 NOTE — NURSING
Patient discharged to home via spouse. PIV removed. Discharge instructions given. Pt verbalized understanding. VSS.

## 2019-08-02 NOTE — TELEPHONE ENCOUNTER
Nephrostomy tube placed yesterday, discharge home this am.  She did not get sent home with any pain meds and she's having a lot of pain.  The otc ibuprofen is not helping.  Sent SC msg to Dr. Hollie Quiroz surgeon on call.  Awaiting response.  She states to contact hospital medicine, as pt was admitted to them.  SC sent to Dr. Tomas Moraes/[facility] Med d Call Team.  Awaiting response.  Dr. Tomas Moraes states he's not on call.  Awaited response from [facility] Med Call Team, no answer.  Called back to patient, left a vm advising pt go to ER or [facility] to see if they will give her a rx for pain medication.Recieved SC message from Sybil Domingo NP, on call provider for [facility] Medicine Call Team, states that provider is gone for the day.      Reason for Disposition   [1] SEVERE post-op pain (e.g., excruciating, pain scale 8-10) AND [2] not controlled with pain medications    Protocols used: POST-OP SYMPTOMS AND [id number]

## 2019-08-02 NOTE — DISCHARGE SUMMARY
"[facility] Hospital Medicine  Discharge Summary      Patient Name: Shannan Alford  MRN: [id number]  Admission Date: 7/31/2019  Hospital Length of Stay: 1 days  Discharge Date and Time: 8/2/2019  9:44 AM  Attending Physician: Lyn att. providers found   Discharging Provider: LIZA Sam  Primary Care Provider: Brent Nuñez MD      HPI:   Shannan Alford is a 38 year old female with ADHD and recurrent UTIs who presents with a worsening "kidney infection." The patient states she was diagnosed by her PCP on 6/17/19 and started on Cipro. She was referred to outpatient Urology. The patient reports right flank pain that has gotten worse despite antibiotics. Today, she came in with a high fever of 103.5F. The patient has not seen a Urologist Reyna Mitchell, but has had recurrent UTIs every 6 weeks and established care with Urology in [place]. She denies any chills, cough, SOB, abdominal pain, dysuria, hematuria, urinary frequency, urinary urgency, vomiting and all other symptoms at this time.  The patient states she last took Tylenol at noon. In the ED, her WBC count was 16,000. UA was described as hazy, with many bacteria, 40 WBCs, 2+ leukocyte terry ace and negative nitrites. CT of the abdomen and pelvis showed bilateral nonobstructing renal calculi, some with staghorn configuration, but no evidence of obstruction and a hyperenhancing wall within the right ureter seen with infection. The patient was given 1g IV Rocephin in the ED. Dr. Way with Urology was consulted in the ED and reports emergency surgery is not needed tonight and he will see patient in the morning.     Procedure(s) (LRB):  Nephrostomy tube placement (Right)      Hospital Course:   The patient presented with pyelonephritis as well as nonobstructing stones bilaterally. She was treated with IV antibiotics. Urology was consulted and recommended right percutaneous nephroureteral catheter placement which was performed by Dr." Ravi on 8/1/19. The patient will be discharged to complete a course of antibiotics and follow up with Dr. Way in 10 days.      Consults:   Consults (From admission, onward)        Status Ordering Provider     Inpatient consult to Urology  Once     Provider:  (Not yet assigned)    Completed ALDA CUELLAR     Inpatient consult to Urology  Once     Provider:  Tomas Way IV, MD    Acknowledged KRISTAL TRIVEDI     Pharmacy to dose Vancomycin consult  Once     Provider:  (Not yet assigned)    Acknowledged JORDAN BENTLEY        Final Active Diagnoses:    Diagnosis Date Noted POA    PRINCIPAL PROBLEM:  Pyelonephritis [N12] 07/31/2019 Yes    Staghorn calculus [N20.0] 07/31/2019 Yes      Problems Resolved During this Admission:    Diagnosis Date Noted Date Resolved POA    Sepsis [A41.9] 08/01/2019 08/02/2019 Yes       Discharged Condition: stable    Disposition: Home or Self Care    Follow Up:  Follow-up Information     Brent Nuñez MD In 3 days.    Specialty:  Family Medicine  Contact information:  [id number] [place] [address]  Reyna LIANG [address]  [phone]             Tomas Way IV, MD.    Specialty:  Urology  Contact information:  [id number] [place] [address] DR Reyna LIANG [address]  [phone]                 Patient Instructions:      Ambulatory Referral to Urology   Referral Priority: Routine Referral Type: Consultation   Referral Reason: Specialty Services Required   Requested Specialty: Urology   Number of Visits Requested: 1     Diet Adult Regular     Activity as tolerated       Significant Diagnostic Studies: Labs:   CMP   Recent Labs   Lab 07/31/19  1818 08/01/19  1209 08/02/19  0457    140 136   K 3.6 3.8 4.1    109 106   CO2 27 24 26   GLU 93 82 103   BUN 9 8 13   CREATININE 0.9 0.7 0.8   CALCIUM 9.8 8.5* 8.4*   PROT 8.2  --  5.6*   ALBUMIN 4.1  --  2.6*   BILITOT 0.5  --  0.3   ALKPHOS 92  --  75   AST 12  --  16   ALT 10  --  14   ANIONGAP 10 7* 4*   ESTGFRAFRICA >60  >60 >60   EGFRNONAA >60 >60 >60   , CBC   Recent Labs   Lab 07/31/19  1818 08/01/19  1209 08/02/19  0457   WBC 16.16* 11.06 13.66*   HGB 14.2 11.1* 10.5*   HCT 40.9 32.9* 31.8*    244 247    and All labs within the past 24 hours have been reviewed    Pending Diagnostic Studies:     Procedure Component Value Units Date/Time    IR Nephrostomy Tube Placement [[id number]] Resulted:  08/02/19 1012    Order Status:  Sent Lab Status:  In process Updated:  08/02/19 1016         Medications:  Reconciled Home Medications:      Medication List      START taking these medications    amoxicillin-clavulanate 875-125mg 875-125 mg per tablet  Commonly known as:  AUGMENTIN  Take 1 tablet by mouth every 12 (twelve) hours. for 10 days        CHANGE how you take these medications    desipramine 75 MG tablet  Commonly known as:  NORPRAMIN  Take 1 tablet (75 mg total) by mouth once daily.  What changed:  when to take this            Indwelling Lines/Drains at time of discharge:   Lines/Drains/Airways     Drain                 Ureteral Drain/Stent 08/01/19 1507 Right ureter 8 Fr. less than 1 day                Time spent on the discharge of patient: Greater than 30 minutes. Patient was seen and examined on the date of discharge and determined to be suitable for discharge.         LIZA Sam  Department of Hospital Medicine  [facility]

## 2019-08-02 NOTE — TELEPHONE ENCOUNTER
S/w patient and she stated that she was admitted in the hospital for kidney infection.  Patient stated that the NP, Nat Chung canceled Norpramin script that was sent on 06/12/2019 visit.  Pharmacy wants clarification regarding should script be filled.

## 2019-08-02 NOTE — TELEPHONE ENCOUNTER
----- Message from Jonathan Cline sent at 8/2/2019  2:27 PM CDT -----  Contact: Fernanda ( [facility])   .Type:  Pharmacy Calling to Clarify an RX    Name of Caller: fernanda   Pharmacy Name: [email or web address]   Prescription Name: desipramine (NORPRAMIN) 75 MG tablet  What do they need to clarify?:   Best Call Back Number: [phone]  Additional Information: pt at pharmacy waiting.           ..  [facility] [address] [place] [id number]  Phone: [phone] Fax: [phone]

## 2019-08-03 ENCOUNTER — NURSE TRIAGE (OUTPATIENT)
Dept: ADMINISTRATIVE | Facility: CLINIC | Age: 39
End: 2019-08-03

## 2019-08-03 ENCOUNTER — HOSPITAL ENCOUNTER (EMERGENCY)
Facility: HOSPITAL | Age: 39
Discharge: HOME | End: 2019-08-03
Attending: EMERGENCY MEDICINE
Payer: COMMERCIAL

## 2019-08-03 VITALS
BODY MASS INDEX: 29.48 KG/M2 | DIASTOLIC BLOOD PRESSURE: 68 MMHG | WEIGHT: 166.44 LBS | HEART RATE: 104 BPM | SYSTOLIC BLOOD PRESSURE: 119 MMHG | RESPIRATION RATE: 20 BRPM | OXYGEN SATURATION: 98 % | TEMPERATURE: 99 F

## 2019-08-03 DIAGNOSIS — Z93.6 NEPHROSTOMY STATUS: ICD-10-CM

## 2019-08-03 DIAGNOSIS — R50.9 FEVER: ICD-10-CM

## 2019-08-03 DIAGNOSIS — Z87.440 HISTORY OF UTI: ICD-10-CM

## 2019-08-03 DIAGNOSIS — K59.00 CONSTIPATION, UNSPECIFIED CONSTIPATION TYPE: Primary | ICD-10-CM

## 2019-08-03 DIAGNOSIS — R00.0 TACHYCARDIA: ICD-10-CM

## 2019-08-03 LAB
ALBUMIN SERPL BCP-MCNC: 3 G/DL (ref 3.5–5.2)
ALP SERPL-CCNC: 101 U/L (ref 55–135)
ALT SERPL W/O P-5'-P-CCNC: 71 U/L (ref 10–44)
ANION GAP SERPL CALC-SCNC: 10 MMOL/L (ref 8–16)
AST SERPL-CCNC: 50 U/L (ref 10–40)
B-HCG UR QL: NEGATIVE
BACTERIA #/AREA URNS HPF: NORMAL /HPF
BACTERIA UR CULT: ABNORMAL
BASOPHILS # BLD AUTO: 0.02 K/UL (ref 0–0.2)
BASOPHILS NFR BLD: 0.2 % (ref 0–1.9)
BILIRUB SERPL-MCNC: 0.3 MG/DL (ref 0.1–1)
BILIRUB UR QL STRIP: NEGATIVE
BUN SERPL-MCNC: 7 MG/DL (ref 6–20)
CALCIUM SERPL-MCNC: 9.3 MG/DL (ref 8.7–10.5)
CHLORIDE SERPL-SCNC: 102 MMOL/L (ref 95–110)
CLARITY UR: CLEAR
CO2 SERPL-SCNC: 28 MMOL/L (ref 23–29)
COLOR UR: YELLOW
CREAT SERPL-MCNC: 0.8 MG/DL (ref 0.5–1.4)
DIFFERENTIAL METHOD: ABNORMAL
EOSINOPHIL # BLD AUTO: 0.2 K/UL (ref 0–0.5)
EOSINOPHIL NFR BLD: 1.9 % (ref 0–8)
ERYTHROCYTE [DISTWIDTH] IN BLOOD BY AUTOMATED COUNT: 12.4 % (ref 11.5–14.5)
EST. GFR  (AFRICAN AMERICAN): >60 ML/MIN/1.73 M^2
EST. GFR  (NON AFRICAN AMERICAN): >60 ML/MIN/1.73 M^2
GLUCOSE SERPL-MCNC: 88 MG/DL (ref 70–110)
GLUCOSE UR QL STRIP: NEGATIVE
HCT VFR BLD AUTO: 35.2 % (ref 37–48.5)
HGB BLD-MCNC: 11.9 G/DL (ref 12–16)
HGB UR QL STRIP: ABNORMAL
HYALINE CASTS #/AREA URNS LPF: 0 /LPF
KETONES UR QL STRIP: NEGATIVE
LACTATE SERPL-SCNC: 0.9 MMOL/L (ref 0.5–2.2)
LEUKOCYTE ESTERASE UR QL STRIP: ABNORMAL
LYMPHOCYTES # BLD AUTO: 1.9 K/UL (ref 1–4.8)
LYMPHOCYTES NFR BLD: 16.7 % (ref 18–48)
MCH RBC QN AUTO: 32 PG (ref 27–31)
MCHC RBC AUTO-ENTMCNC: 33.8 G/DL (ref 32–36)
MCV RBC AUTO: 95 FL (ref 82–98)
MICROSCOPIC COMMENT: NORMAL
MONOCYTES # BLD AUTO: 0.8 K/UL (ref 0.3–1)
MONOCYTES NFR BLD: 6.8 % (ref 4–15)
NEUTROPHILS # BLD AUTO: 8.4 K/UL (ref 1.8–7.7)
NEUTROPHILS NFR BLD: 74.6 % (ref 38–73)
NITRITE UR QL STRIP: NEGATIVE
PH UR STRIP: 7 [PH] (ref 5–8)
PLATELET # BLD AUTO: 327 K/UL (ref 150–350)
PMV BLD AUTO: 10.4 FL (ref 9.2–12.9)
POTASSIUM SERPL-SCNC: 3.7 MMOL/L (ref 3.5–5.1)
PROCALCITONIN SERPL IA-MCNC: 0.1 NG/ML
PROT SERPL-MCNC: 7 G/DL (ref 6–8.4)
PROT UR QL STRIP: ABNORMAL
RBC # BLD AUTO: 3.72 M/UL (ref 4–5.4)
RBC #/AREA URNS HPF: 3 /HPF (ref 0–4)
SODIUM SERPL-SCNC: 140 MMOL/L (ref 136–145)
SP GR UR STRIP: 1.01 (ref 1–1.03)
URN SPEC COLLECT METH UR: ABNORMAL
UROBILINOGEN UR STRIP-ACNC: NEGATIVE EU/DL
WBC # BLD AUTO: 11.3 K/UL (ref 3.9–12.7)
WBC #/AREA URNS HPF: 3 /HPF (ref 0–5)

## 2019-08-03 PROCEDURE — 87040 BLOOD CULTURE FOR BACTERIA: CPT

## 2019-08-03 PROCEDURE — 81000 URINALYSIS NONAUTO W/SCOPE: CPT

## 2019-08-03 PROCEDURE — 51702 INSERT TEMP BLADDER CATH: CPT

## 2019-08-03 PROCEDURE — 36415 COLL VENOUS BLD VENIPUNCTURE: CPT

## 2019-08-03 PROCEDURE — 93005 ELECTROCARDIOGRAM TRACING: CPT

## 2019-08-03 PROCEDURE — 93010 EKG 12-LEAD: ICD-10-PCS | Mod: ,,, | Performed by: INTERNAL MEDICINE

## 2019-08-03 PROCEDURE — 80053 COMPREHEN METABOLIC PANEL: CPT

## 2019-08-03 PROCEDURE — 96374 THER/PROPH/DIAG INJ IV PUSH: CPT | Mod: 59

## 2019-08-03 PROCEDURE — 93010 ELECTROCARDIOGRAM REPORT: CPT | Mod: ,,, | Performed by: INTERNAL MEDICINE

## 2019-08-03 PROCEDURE — 99285 EMERGENCY DEPT VISIT HI MDM: CPT | Mod: 25

## 2019-08-03 PROCEDURE — 85025 COMPLETE CBC W/AUTO DIFF WBC: CPT

## 2019-08-03 PROCEDURE — 84145 PROCALCITONIN (PCT): CPT

## 2019-08-03 PROCEDURE — 83605 ASSAY OF LACTIC ACID: CPT

## 2019-08-03 PROCEDURE — 81025 URINE PREGNANCY TEST: CPT

## 2019-08-03 PROCEDURE — 63600175 PHARM REV CODE 636 W HCPCS: Performed by: EMERGENCY MEDICINE

## 2019-08-03 RX ORDER — KETOROLAC TROMETHAMINE 10 MG/1
10 TABLET, FILM COATED ORAL EVERY 6 HOURS PRN
Qty: 16 TABLET | Refills: 0 | Status: SHIPPED | OUTPATIENT
Start: 2019-08-03 | End: 2019-08-19

## 2019-08-03 RX ORDER — HYDROCODONE BITARTRATE AND ACETAMINOPHEN 5; 325 MG/1; MG/1
1 TABLET ORAL EVERY 6 HOURS PRN
Qty: 10 TABLET | Refills: 0 | Status: ON HOLD | OUTPATIENT
Start: 2019-08-03 | End: 2019-09-04 | Stop reason: HOSPADM

## 2019-08-03 RX ORDER — KETOROLAC TROMETHAMINE 30 MG/ML
30 INJECTION, SOLUTION INTRAMUSCULAR; INTRAVENOUS
Status: COMPLETED | OUTPATIENT
Start: 2019-08-03 | End: 2019-08-03

## 2019-08-03 RX ORDER — MORPHINE SULFATE 4 MG/ML
4 INJECTION, SOLUTION INTRAMUSCULAR; INTRAVENOUS
Status: DISCONTINUED | OUTPATIENT
Start: 2019-08-03 | End: 2019-08-03

## 2019-08-03 RX ORDER — ONDANSETRON 2 MG/ML
4 INJECTION INTRAMUSCULAR; INTRAVENOUS ONCE
Status: DISCONTINUED | OUTPATIENT
Start: 2019-08-03 | End: 2019-08-03

## 2019-08-03 RX ADMIN — SODIUM CHLORIDE 2265 ML: 0.9 INJECTION, SOLUTION INTRAVENOUS at 05:08

## 2019-08-03 RX ADMIN — KETOROLAC TROMETHAMINE 30 MG: 30 INJECTION, SOLUTION INTRAMUSCULAR at 06:08

## 2019-08-03 NOTE — ED PROVIDER NOTES
"1SCRIBE #1 NOTE: I, Alexander Day, am scribing for, and in the presence of, Savanna Blackman DO. I have scribed the entire note.       History     Chief Complaint   Patient presents with    Post-op Problem     nephrostomy tube placed tuesday of this week here; c/o fever since this morning and inability to urinate since 0715 today     Review of patient's allergies indicates:   Allergen Reactions    Morphine Shortness Of Breath         History of Present Illness     HPI    8/3/2019, 5:22 PM  History obtained from the patient      History of Present Illness: Shannan Alford is a 38 y.o. female patient who presents to the Emergency Department for evaluation of post-op problem which onset gradually this morning. Pt states she woke up this morning with fever of 101.2F and last urinated at today at 7:15am. Pt states she has drank 2L of fluids today but still has not urinated. Pt states the urinary retention feels like a time in the past when she had a stone stuck in the urethra. On old chart review, pt was started on Cipro 6/17/2019 and referred to outpatient urology, pt had a CT scan that revealed "Bilateral nonobstructing renal calculi some with staghorn configuration. There is no evidence of obstruction but the right ureter demonstrates a hyperenhancing wall which can be seen with infection," urology was consulted and recommended right percutaneous nephroureteral catherter placement which was performed by Dr. Rodrigues on 8/1/19, and was informed to follow up with Dr. Way on 8/12/19. Symptoms today are constant and moderate in severity, and pt rates abdominal pain as 10/10. No mitigating or exacerbating factors reported. Associated sxs include RLQ and lower abdominal pain. Patient denies any CP, SOB, diarrhea, hematuria, constipation, n/v, and all other sxs at this time. Pt states she last took Tylenol at 3pm. No further complaints or concerns at this time.         Arrival mode: Personal vehicle     PCP: Brent " MALLORY Nuñez MD        Past Medical History:  Past Medical History:   Diagnosis Date    ADHD (attention deficit hyperactivity disorder)     Depression        Past Surgical History:  Past Surgical History:   Procedure Laterality Date    Nephrostomy tube placement Right 8/1/2019    Performed by Zach Rodrigues MD at [facility] CATH LAB         Family History:  Family History   Problem Relation Age of Onset    Hypertension Mother     No Known Problems Father        Social History:  Social History     Tobacco Use    Smoking status: Never Smoker    Smokeless tobacco: Never Used   Substance and Sexual Activity    Alcohol use: Yes     Frequency: Monthly or less    Drug use: No    Sexual activity: Unknown         Review of Systems     Review of Systems   Constitutional: Positive for fever.   HENT: Negative for sore throat.    Respiratory: Negative for shortness of breath.    Cardiovascular: Negative for chest pain.   Gastrointestinal: Positive for abdominal pain. Negative for constipation, diarrhea, nausea and vomiting.   Genitourinary: Positive for decreased urine volume. Negative for hematuria.   Musculoskeletal: Negative for back pain.   Skin: Negative for rash.   Neurological: Negative for weakness.   Hematological: Does not bruise/bleed easily.   All other systems reviewed and are negative.       Physical Exam     Initial Vitals [08/03/19 1656]   BP Pulse Resp Temp SpO2   133/68 103 16 98.1 °F (36.7 °C) 98 %      MAP       --          Physical Exam  Nursing Notes and Vital Signs Reviewed.  Constitutional: Patient is in no apparent distress. Well-developed and well-nourished. Non-toxic appearing.   Head: Atraumatic. Normocephalic.  Eyes: PERRL. EOM intact. Conjunctivae are not pale. No scleral icterus.  ENT: Mucous membranes are moist. Oropharynx is clear and symmetric.    Neck: Supple. Full ROM. No lymphadenopathy.  Cardiovascular: Tachycardic. Regular rhythm. No murmurs, rubs, or gallops. Distal pulses are 2+ and  symmetric.  Pulmonary/Chest: No respiratory distress. Clear to auscultation bilaterally. No wheezing or rales.  Abdominal: Soft and non-distended.  There is suprapubic tenderness.  No rebound, guarding, or rigidity. Good bowel sounds.  Genitourinary: No CVA tenderness. Tenderness along right flank with bandage in place from nephrostomy tube.  Clear urine in the nephrostomy bag.  Musculoskeletal: Moves all extremities. No obvious deformities. No edema.  Skin: Warm and dry.  Neurological:  Alert, awake, and appropriate.  Normal speech.  No acute focal neurological deficits are appreciated.  Psychiatric: Normal affect. Good eye contact. Appropriate in content.     ED Course   Procedures  ED Vital Signs:  Vitals:    08/03/19 1656 08/03/19 1841 08/03/19 1900 08/03/19 1930   BP: 133/68 119/65 (!) 114/59 113/64   Pulse: 103 89 89 93   Resp: 16 16 16 20   Temp: 98.1 °F (36.7 °C)      TempSrc: Oral      SpO2: 98% 99% 99% 100%   Weight: 75.5 kg (166 lb 7.2 oz)       08/03/19 2000   BP: 119/68   Pulse: 104   Resp: 20   Temp: 98.8 °F (37.1 °C)   TempSrc:    SpO2: 98%   Weight:        Abnormal Lab Results:  Labs Reviewed   CBC W/ AUTO DIFFERENTIAL - Abnormal; Notable for the following components:       Result Value    RBC 3.72 (*)     Hemoglobin 11.9 (*)     Hematocrit 35.2 (*)     Mean Corpuscular Hemoglobin 32.0 (*)     Gran # (ANC) 8.4 (*)     Gran% 74.6 (*)     Lymph% 16.7 (*)     All other components within normal limits   COMPREHENSIVE METABOLIC PANEL - Abnormal; Notable for the following components:    Albumin 3.0 (*)     AST 50 (*)     ALT 71 (*)     All other components within normal limits   URINALYSIS, REFLEX TO URINE CULTURE - Abnormal; Notable for the following components:    Protein, UA 2+ (*)     Occult Blood UA 3+ (*)     Leukocytes, UA 2+ (*)     All other components within normal limits    Narrative:     Preferred Collection Type->Urine, Clean Catch   CULTURE, BLOOD   CULTURE, BLOOD   CULTURE, URINE   LACTIC  ACID, PLASMA   PROCALCITONIN   PREGNANCY TEST, URINE RAPID   URINALYSIS MICROSCOPIC    Narrative:     Preferred Collection Type->Urine, Clean Catch   LACTIC ACID, PLASMA   URINALYSIS        All Lab Results:  Results for orders placed or performed during the hospital encounter of 08/03/19   CBC auto differential   Result Value Ref Range    WBC 11.30 3.90 - 12.70 K/uL    RBC 3.72 (L) 4.00 - 5.40 M/uL    Hemoglobin 11.9 (L) 12.0 - 16.0 g/dL    Hematocrit 35.2 (L) 37.0 - 48.5 %    Mean Corpuscular Volume 95 82 - 98 fL    Mean Corpuscular Hemoglobin 32.0 (H) 27.0 - 31.0 pg    Mean Corpuscular Hemoglobin Conc 33.8 32.0 - 36.0 g/dL    RDW 12.4 11.5 - 14.5 %    Platelets 327 150 - 350 K/uL    MPV 10.4 9.2 - 12.9 fL    Gran # (ANC) 8.4 (H) 1.8 - 7.7 K/uL    Lymph # 1.9 1.0 - 4.8 K/uL    Mono # 0.8 0.3 - 1.0 K/uL    Eos # 0.2 0.0 - 0.5 K/uL    Baso # 0.02 0.00 - 0.20 K/uL    Gran% 74.6 (H) 38.0 - 73.0 %    Lymph% 16.7 (L) 18.0 - 48.0 %    Mono% 6.8 4.0 - 15.0 %    Eosinophil% 1.9 0.0 - 8.0 %    Basophil% 0.2 0.0 - 1.9 %    Differential Method Automated    Comprehensive metabolic panel   Result Value Ref Range    Sodium 140 136 - 145 mmol/L    Potassium 3.7 3.5 - 5.1 mmol/L    Chloride 102 95 - 110 mmol/L    CO2 28 23 - 29 mmol/L    Glucose 88 70 - 110 mg/dL    BUN, Bld 7 6 - 20 mg/dL    Creatinine 0.8 0.5 - 1.4 mg/dL    Calcium 9.3 8.7 - 10.5 mg/dL    Total Protein 7.0 6.0 - 8.4 g/dL    Albumin 3.0 (L) 3.5 - 5.2 g/dL    Total Bilirubin 0.3 0.1 - 1.0 mg/dL    Alkaline Phosphatase 101 55 - 135 U/L    AST 50 (H) 10 - 40 U/L    ALT 71 (H) 10 - 44 U/L    Anion Gap 10 8 - 16 mmol/L    eGFR if African American >60 >60 mL/min/1.73 m^2    eGFR if non African American >60 >60 mL/min/1.73 m^2   Lactic acid, plasma #1   Result Value Ref Range    Lactate (Lactic Acid) 0.9 0.5 - 2.2 mmol/L   Urinalysis, Reflex to Urine Culture Urine, Clean Catch   Result Value Ref Range    Specimen UA Urine, Clean Catch     Color, UA Yellow Yellow, Straw,  Qing    Appearance, UA Clear Clear    pH, UA 7.0 5.0 - 8.0    Specific Gravity, UA 1.010 1.005 - 1.030    Protein, UA 2+ (A) Negative    Glucose, UA Negative Negative    Ketones, UA Negative Negative    Bilirubin (UA) Negative Negative    Occult Blood UA 3+ (A) Negative    Nitrite, UA Negative Negative    Urobilinogen, UA Negative <2.0 EU/dL    Leukocytes, UA 2+ (A) Negative   Procalcitonin   Result Value Ref Range    Procalcitonin 0.10 <0.25 ng/mL   Pregnancy, urine rapid   Result Value Ref Range    Preg Test, Ur Negative    Urinalysis Microscopic   Result Value Ref Range    RBC, UA 3 0 - 4 /hpf    WBC, UA 3 0 - 5 /hpf    Bacteria None None-Occ /hpf    Hyaline Casts, UA 0 0-1/lpf /lpf    Microscopic Comment SEE COMMENT        Results for MAGDA ESTRADA (MRN [id number]) from 7/31/2019 1738   Ref. Range 7/31/2019 17:38   Specimen UA Unknown Urine, Clean Catch   Color, UA Latest Ref Range: Yellow, Straw, Qing  Yellow   Appearance, UA Latest Ref Range: Clear  Hazy (A)   Specific Breeding, UA Latest Ref Range: 1.005 - 1.030  1.010   pH, UA Latest Ref Range: 5.0 - 8.0  7.0   Protein, UA Latest Ref Range: Negative  Negative   Glucose, UA Latest Ref Range: Negative  Negative   Ketones, UA Latest Ref Range: Negative  Negative   Occult Blood UA Latest Ref Range: Negative  2+ (A)   NITRITE UA Latest Ref Range: Negative  Negative   UROBILINOGEN UA Latest Ref Range: <2.0 EU/dL Negative   Bilirubin (UA) Latest Ref Range: Negative  Negative   Leukocytes, UA Latest Ref Range: Negative  2+ (A)   RBC, UA Latest Ref Range: 0 - 4 /hpf 0   WBC, UA Latest Ref Range: 0 - 5 /hpf 40 (H)   Bacteria, UA Latest Ref Range: None-Occ /hpf Many (A)   Squam Epithel, UA Latest Units: /hpf 3     Results for MAGDA ESTRADA (MRN [id number]) from 7/31/2019 1738  Component 3d ago   Urine Culture, Routine Abnormal    ESCHERICHIA COLI   > 100,000 cfu/ml     Resulting Agency [facility]   Susceptibility      Escherichia coli     CULTURE, URINE     Amox/K  Clav'ate <=8/4 mcg/mL Sensitive     Amp/Sulbactam 16/8 mcg/mL Intermediate     Ampicillin >16 mcg/mL Resistant     Cefazolin <=8 mcg/mL Sensitive     Cefepime <=8 mcg/mL Sensitive     Ceftriaxone <=8 mcg/mL Sensitive     Ciprofloxacin >2 mcg/mL Resistant     Gentamicin <=4 mcg/mL Sensitive     Levofloxacin >4 mcg/mL Resistant     Nitrofurantoin <=32 mcg/mL Sensitive     Piperacillin/Tazo <=16 mcg/mL Sensitive     Tetracycline <=4 mcg/mL Sensitive     Tobramycin <=4 mcg/mL Sensitive     Trimeth/Sulfa <=2/38 mcg/mL Sensitive             Imaging Results:  Imaging Results          CT Renal Stone Study ABD Pelvis WO (Final result)  Result time 08/03/19 18:34:47    Final result by Jamil Rose MD (08/03/19 18:34:47)                 Impression:      Right-sided nephrostomy tube is present with right-sided ureteral stent.  No hydronephrosis is demonstrated.  Some gas is seen in the pelvocaliceal system and posterior right perirenal soft tissues which is presumably present from previous instrumentation.  Multiple nonobstructing left-sided kidney stones are present.    All CT scans at this location or performed using dose modulation techniques as is appropriate to perform the exam including the following: Automated exposure control, adjustment of the mA and/or kv according to patient size, or the use of iterative reconstruction technique.      Electronically signed by: Jamil Rose  Date:    08/03/2019  Time:    18:34             Narrative:    EXAMINATION:  CT RENAL STONE STUDY ABD PELVIS WO    CLINICAL HISTORY:  flank pain, status post nephroureteral tube; Fever, unspecified    TECHNIQUE:  Low dose axial images, sagittal and coronal reformations were obtained from the lung bases to the pubic symphysis.  Contrast was not administered.    COMPARISON:  07/31/2019    FINDINGS:  Heart: Normal in size. No pericardial effusion.    Lung Bases: Some mild pleural thickening and dependent changes present bilaterally.  No active  infiltrate is demonstrated.    Liver: Normal in size and attenuation, with no focal hepatic lesions.    Gallbladder: No calcified gallstones.    Bile Ducts: No evidence of dilated ducts.    Pancreas: No mass or peripancreatic fat stranding.    Spleen: Unremarkable.    Adrenals: Unremarkable.    Kidneys/ Ureters: A right-sided nephrostomy tube is present with its distal tip in the renal pelvis.  Right-sided ureteral stent is present with its distal tip in the bladder.  Some gas is seen in the right pararenal soft tissues posteriorly presumably from instrumentation.  Gas is seen in the right pelvic calyceal system.  Multiple stones are present.  There does not appear to be significant hydronephrosis.  No stones are seen along the course of the ureteral stent.  Numerous chronic stones are seen in the left kidney.  No left-sided hydronephrosis is present.  The left ureter appears free of any calculus.    Bladder: A Chauhan catheter is present.  The bladder is empty.    Reproductive organs: Unremarkable.    GI Tract/Mesentery: There is large amount of gas and stool in the colon.  No acute obstructive change or inflammation of bowel is demonstrated.    Peritoneal Space: A small amount of free fluid is seen dependently in the cul-de-sac.  No free air.    Retroperitoneum: No significant adenopathy.    Abdominal wall: Unremarkable.    Vasculature: No significant atherosclerosis or aneurysm.    Bones: No acute fracture.                               X-Ray Chest AP Portable (Final result)  Result time 08/03/19 17:54:59    Final result by Jamil Rose MD (08/03/19 17:54:59)                 Impression:      No acute abnormality.      Electronically signed by: Jamil Rose  Date:    08/03/2019  Time:    17:54             Narrative:    EXAMINATION:  XR CHEST AP PORTABLE    CLINICAL HISTORY:  Sepsis;    TECHNIQUE:  Single frontal view of the chest was performed.    COMPARISON:  None    FINDINGS:  The lungs are clear, with  normal appearance of pulmonary vasculature and no pleural effusion or pneumothorax.    The cardiac silhouette is normal in size. The hilar and mediastinal contours are unremarkable.    Bones are intact.  There is a radiopaque stent of the right upper quadrant which may be a nephrostomy with multiple kidney stones present.                                 The EKG was ordered, reviewed, and independently interpreted by the ED provider.  Interpretation time: 1735  Rate: 100 BPM  Rhythm: normal sinus rhythm  Interpretation: cannot rule out anterior infarct. No STEMI.           The Emergency Provider reviewed the vital signs and test results, which are outlined above.     ED Discussion     6:17 PM:  Gilmore cath was attempted with no return of urine.     6:42 PM:  Gilmore cath and nephrostomy in place. Catheter not draining but right nephrostomy is draining urine. Since no signs of sepsis, will change from 30ml/kilo bolus to 100cc/hour. Patient reports improved pain of 7/10.       7:15 PM: Discussed pt's case with Dr. Tomas Way IV (Urology) who states he is not surprised that pt is not putting out urine. Pt states that urine is most likely going from the left kidney to the right on account of the ureteral stent and has no signs of obstruction or sepsis. Dr. Rayna CARNEY agrees with plan to start patient on a narcotic pain medication, that the gilmore catheter can be removed, and that pt can be sent home to follow up in clinic.    7:34 PM: Reassessed pt at this time.  Pt states her condition has improved at this time. Pt states that she has been lying flat and reclined today which added to her sxs. Discussed with pt all pertinent ED information and results. Discussed pt dx and plan of tx. Gave pt all f/u and return to the ED instructions. All questions and concerns were addressed at this time. Pt expresses understanding of information and instructions, and is comfortable with plan to discharge. Pt is stable for  discharge.    I discussed with patient and/or family/caretaker that evaluation in the ED does not suggest any emergent or life threatening medical conditions requiring immediate intervention beyond what was provided in the ED, and I believe patient is safe for discharge.  Regardless, an unremarkable evaluation in the ED does not preclude the development or presence of a serious of life threatening condition. As such, patient was instructed to return immediately for any worsening or change in current symptoms.    Driving or other activities under influence of medications - Patient and/or family/caretaker was given a prescription for, or instructed to use a medicine that may impair ability to drive, operate machinery, or participate in other potentially dangerous activities.  Patient was instructed not to participate in these activities while under the influence of these medications.      ED Medication(s):  Medications   sodium chloride 0.9% bolus 2,265 mL (2,265 mLs Intravenous New Bag 8/3/19 7093)   ketorolac injection 30 mg (30 mg Intravenous Given 8/3/19 1835)       New Prescriptions    HYDROCODONE-ACETAMINOPHEN (NORCO) 5-325 MG PER TABLET    Take 1 tablet by mouth every 6 (six) hours as needed for Pain.    KETOROLAC (TORADOL) 10 MG TABLET    Take 1 tablet (10 mg total) by mouth every 6 (six) hours as needed for Pain.       Follow-up Information     Tomas Way IV, MD In 2 days.    Specialty:  Urology  Why:  Return to emergency department for:  Temperature greater than 100.4, weakness, decreased urine output from the nephrostomy tube, blood in urine, or other concerns.  Contact information:  [address] [place] [address] DR Reyna LIANG [address] [phone]                         Medical Decision Making:   History:   Old Medical Records: I decided to obtain old medical records.  Old Records Summarized: records from previous admission(s).       <> Summary of Records: Urine Culture, Routine Abnormal    ESCHERICHIA  COLI   > 100,000 cfu/ml    Resulting Agency [facility]  Susceptibility        Escherichia coli    CULTURE, URINE    Amox/K Clav'ate <=8/4 mcg/mL Sensitive    Amp/Sulbactam 16/8 mcg/mL Intermediate    Ampicillin >16 mcg/mL Resistant    Cefazolin <=8 mcg/mL Sensitive    Cefepime <=8 mcg/mL Sensitive    Ceftriaxone <=8 mcg/mL Sensitive    Ciprofloxacin >2 mcg/mL Resistant    Gentamicin <=4 mcg/mL Sensitive    Levofloxacin >4 mcg/mL Resistant    Nitrofurantoin <=32 mcg/mL Sensitive    Piperacillin/Tazo <=16 mcg/mL Sensitive    Tetracycline <=4 mcg/mL Sensitive    Tobramycin <=4 mcg/mL Sensitive    Trimeth/Sulfa <=2/38 mcg/mL Sensitive          Linear View    Narrative   Performed by: [facility]     Preferred Collection Type->Urine, Clean Catch    Specimen Collected: 07/31/19 17:38 Last Resulted: 08/03/19 03:44        Clinical Tests:   Lab Tests: Ordered and Reviewed  Radiological Study: Ordered and Reviewed  Medical Tests: Ordered and Reviewed             Scribe Attestation:   Scribe #1: I performed the above scribed service and the documentation accurately describes the services I performed. I attest to the accuracy of the note.     Attending:   Physician Attestation Statement for Scribe #1: I, Savanna Blackman DO, personally performed the services described in this documentation, as scribed by Raghu Tolentino, in my presence, and it is both accurate and complete.           Clinical Impression       ICD-10-CM ICD-9-CM   1. Constipation, unspecified constipation type K59.00 564.00   2. Tachycardia R00.0 785.0   3. Fever; self reported R50.9 780.60   4. History of UTI, E. coli, sensitive to Augmentin Z87.440 V13.02   5. Nephrostomy status, right Z93.6 V44.6       Disposition:   Disposition: Discharged  Condition: Stable         Savanna Blackman DO  08/03/19 2013

## 2019-08-03 NOTE — TELEPHONE ENCOUNTER
Reason for Disposition   [1] Unable to urinate (or only a few drops) > 4 hours AND     [2] bladder feels very full (e.g., palpable bladder or strong urge to urinate)    Protocols used: ST URINARY SYMPTOMS-A-    Patient states she drank 2 L of water and has not urinated since 7am and she is in severe pain. Her temp is 101 (otic) . Advised patient to go to the ED for urinary retention and the fever since it is within 30 days of a procedure.   she verbalized understanding.

## 2019-08-03 NOTE — ED TRIAGE NOTES
Arrives to ER with complaints of decreased urine out put reports having nephrostomy placed on Thursday due to stones, nephrostomy is draining appropriately. Complaints of pain to nephrostomy site that radiates down to the back rates pain 10/10. Pt. Reports elevated temp of about 101 earlier today, took tylenol prior to arrival in ER. Denies n/v/d

## 2019-08-04 NOTE — ED NOTES
Patient assessed. laying in bed comfortably. Rate pain 7/10. Fluid rate changed to 150ml/hr per MD order . Will continue to monitor

## 2019-08-04 NOTE — DISCHARGE INSTRUCTIONS
Consider taking over-the-counter MiraLax -1 scoop in beverage of choice daily to help with constipation.    Pain Medication Interaction warning.    Do not take any sedative medications (benadryl, ativan, xanax, Klonopin, trazadone); medicine for sleep; other pain pills (lortab, percocet), alcohol, with your narcotic prescription.  This could lead to an accidental overdose and possible death.      Continue Augmentin

## 2019-08-05 ENCOUNTER — TELEPHONE (OUTPATIENT)
Dept: UROLOGY | Facility: CLINIC | Age: 39
End: 2019-08-05

## 2019-08-05 NOTE — TELEPHONE ENCOUNTER
----- Message from Jonathan Cline sent at 8/2/2019  4:25 PM CDT -----  Contact: pt   .Type:  Needs Medical Advice    Who Called: pt   Symptoms (please be specific): pain due to surgery   How long has patient had these symptoms:   2 days   Pharmacy name and phone #:   [facility]   Would the patient rather a call back or a response via [facility]? Callback   Best Call Back Number:  ..[phone]    Additional Information:                   ..  [facility] [address] [place] [address]  Phone: [phone] Fax: [phone]                ..[phone]

## 2019-08-06 ENCOUNTER — PATIENT MESSAGE (OUTPATIENT)
Dept: INTERNAL MEDICINE | Facility: CLINIC | Age: 39
End: 2019-08-06

## 2019-08-06 DIAGNOSIS — F32.A DEPRESSION, UNSPECIFIED DEPRESSION TYPE: Primary | ICD-10-CM

## 2019-08-06 DIAGNOSIS — F90.0 ADHD, PREDOMINANTLY INATTENTIVE TYPE: ICD-10-CM

## 2019-08-06 LAB
BACTERIA BLD CULT: NORMAL
BACTERIA BLD CULT: NORMAL

## 2019-08-06 RX ORDER — DESIPRAMINE HYDROCHLORIDE 75 MG/1
75 TABLET ORAL DAILY
Qty: 90 TABLET | Refills: 0 | Status: SHIPPED | OUTPATIENT
Start: 2019-08-06 | End: 2020-01-03

## 2019-08-06 NOTE — TELEPHONE ENCOUNTER
I sent a reorder for the prescription.    Send a message to patient to check with her urologist that he is okay with her continuing it since it could cause urinary retention and possible increased bladder infections.

## 2019-08-07 NOTE — TELEPHONE ENCOUNTER
Patient advised of information fper Dr. Nuñez's request.  Patient states she will contact urologist.

## 2019-08-09 ENCOUNTER — HOSPITAL ENCOUNTER (EMERGENCY)
Facility: HOSPITAL | Age: 39
Discharge: HOME | End: 2019-08-09
Attending: EMERGENCY MEDICINE
Payer: COMMERCIAL

## 2019-08-09 VITALS
TEMPERATURE: 98 F | RESPIRATION RATE: 18 BRPM | WEIGHT: 166.31 LBS | DIASTOLIC BLOOD PRESSURE: 74 MMHG | OXYGEN SATURATION: 98 % | BODY MASS INDEX: 29.47 KG/M2 | HEIGHT: 63 IN | SYSTOLIC BLOOD PRESSURE: 134 MMHG | HEART RATE: 85 BPM

## 2019-08-09 DIAGNOSIS — Z93.6 NEPHROSTOMY STATUS: ICD-10-CM
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
## 2019-08-09 DIAGNOSIS — M62.830 BACK SPASM: ICD-10-CM

## 2019-08-09 LAB
BACTERIA BLD CULT: NORMAL
BACTERIA BLD CULT: NORMAL

## 2019-08-09 PROCEDURE — 99283 EMERGENCY DEPT VISIT LOW MDM: CPT

## 2019-08-09 RX ORDER — METHOCARBAMOL 500 MG/1
1000 TABLET, FILM COATED ORAL 3 TIMES DAILY
Qty: 30 TABLET | Refills: 0 | Status: SHIPPED | OUTPATIENT
Start: 2019-08-09 | End: 2019-08-14
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
## 2019-08-12 ENCOUNTER — OFFICE VISIT (OUTPATIENT)
Dept: UROLOGY | Facility: CLINIC | Age: 39
End: 2019-08-12
Payer: COMMERCIAL

## 2019-08-12 ENCOUNTER — CLINICAL SUPPORT (OUTPATIENT)
Dept: CARDIOLOGY | Facility: CLINIC | Age: 39
End: 2019-08-12
Payer: COMMERCIAL

## 2019-08-12 ENCOUNTER — HOSPITAL ENCOUNTER (OUTPATIENT)
Dept: RADIOLOGY | Facility: HOSPITAL | Age: 39
Discharge: HOME | End: 2019-08-12
Attending: UROLOGY
Payer: COMMERCIAL

## 2019-08-12 VITALS
HEIGHT: 63 IN | WEIGHT: 162.06 LBS | BODY MASS INDEX: 28.71 KG/M2 | DIASTOLIC BLOOD PRESSURE: 72 MMHG | SYSTOLIC BLOOD PRESSURE: 122 MMHG | HEART RATE: 97 BPM

## 2019-08-12 DIAGNOSIS — N20.0 RENAL STONE: Primary | ICD-10-CM

## 2019-08-12 LAB
BILIRUB SERPL-MCNC: NORMAL MG/DL
BLOOD URINE, POC: 250
COLOR, POC UA: YELLOW
GLUCOSE UR QL STRIP: NORMAL
KETONES UR QL STRIP: NORMAL
LEUKOCYTE ESTERASE URINE, POC: 1
NITRITE, POC UA: NORMAL
PH, POC UA: 8
PROTEIN, POC: NORMAL
SPECIFIC GRAVITY, POC UA: 1
UROBILINOGEN, POC UA: NORMAL

## 2019-08-12 PROCEDURE — 71046 X-RAY EXAM CHEST 2 VIEWS: CPT | Mod: 26,,, | Performed by: RADIOLOGY

## 2019-08-12 PROCEDURE — 93010 EKG 12-LEAD: ICD-10-PCS | Mod: S$GLB,,, | Performed by: NUCLEAR MEDICINE

## 2019-08-12 PROCEDURE — 81002 URINALYSIS NONAUTO W/O SCOPE: CPT | Mod: PBBFAC | Performed by: UROLOGY

## 2019-08-12 PROCEDURE — 71046 XR CHEST PA AND LATERAL: ICD-10-PCS | Mod: 26,,, | Performed by: RADIOLOGY

## 2019-08-12 PROCEDURE — 99214 OFFICE O/P EST MOD 30 MIN: CPT | Mod: 57,S$GLB,, | Performed by: UROLOGY

## 2019-08-12 PROCEDURE — 93005 ELECTROCARDIOGRAM TRACING: CPT | Mod: PBBFAC | Performed by: NUCLEAR MEDICINE

## 2019-08-12 PROCEDURE — 99999 PR PBB SHADOW E&M-EST. PATIENT-LVL IV: ICD-10-PCS | Mod: PBBFAC,,, | Performed by: UROLOGY

## 2019-08-12 PROCEDURE — 99214 PR OFFICE/OUTPT VISIT, EST, LEVL IV, 30-39 MIN: ICD-10-PCS | Mod: 57,S$GLB,, | Performed by: UROLOGY

## 2019-08-12 PROCEDURE — 93005 ELECTROCARDIOGRAM TRACING: CPT | Mod: S$GLB,,, | Performed by: UROLOGY

## 2019-08-12 PROCEDURE — 93010 ELECTROCARDIOGRAM REPORT: CPT | Mod: S$GLB,,, | Performed by: NUCLEAR MEDICINE

## 2019-08-12 PROCEDURE — 71046 X-RAY EXAM CHEST 2 VIEWS: CPT | Mod: TC

## 2019-08-12 PROCEDURE — 93005 EKG 12-LEAD: ICD-10-PCS | Mod: S$GLB,,, | Performed by: UROLOGY

## 2019-08-12 PROCEDURE — 99214 OFFICE O/P EST MOD 30 MIN: CPT | Mod: PBBFAC,25 | Performed by: UROLOGY

## 2019-08-12 PROCEDURE — 99999 PR PBB SHADOW E&M-EST. PATIENT-LVL IV: CPT | Mod: PBBFAC,,, | Performed by: UROLOGY

## 2019-08-12 NOTE — PROGRESS NOTES
Chief Complaint: Right staghorn calculus; pyelonephritis    HPI:   8/12/19: Right nephroureterostomy in place anticipating PCNL soon.  Asks about norpramin and I have no contraindications to it.  8/1/19: 37 yo woman since age 18 has had trouble with renal stones, and has had multiple ESWL/URS for same, mostly on the right side.  Last imaging 10 years ago while pregnant and no interval followup.  Has had a UTI for some months now without resolution.  Referred to ER by PCP after presentation with 103+ fever.  Feeling much better now.  Consistent right flank pain.  No other abd/pelvic pain and no exac/rel factors.  No hematuria.  No urinary bother.  Normal sexual function.  CT shows bilateral renal stones; on right has multiple blunted and chronically atrophied calyces with a staghorn likely obstructive in those areas.  No cris abscess but suggestive of pyelonephritis with chronic obstruction, partial XGP?    Allergies:  Morphine    Medications:  See chart    Review of Systems:  General: No fever, chills, fatigability, or weight loss.  Skin: No rashes, itching, or changes in color or texture of skin.  Chest: Denies ALVAREZ, cyanosis, wheezing, cough, and sputum production.  Abdomen: Appetite fine. No weight loss. Denies diarrhea, abdominal pain, hematemesis, or blood in stool.  Musculoskeletal: No joint stiffness or swelling. Denies back pain.  : As above.  All other review of systems negative.    PMH:   has a past medical history of ADHD (attention deficit hyperactivity disorder) and Depression.    PSH:   has a past surgical history that includes Fluoroscopy (Right, 8/1/2019). ESWL/URS.    FamHx: family history includes Hypertension in her mother; No Known Problems in her father.    SocHx:  reports that she has never smoked. She has never used smokeless tobacco. She reports that she drinks alcohol. She reports that she does not use drugs.      Physical Exam:  Vitals:    08/12/19 1044   BP: 122/72   Pulse: 97      General: A&Ox3, no apparent distress, no deformities  Neck: No masses, normal thyroid  Lungs: normal inspiration, no use of accessory muscles  Heart: normal pulse, no arrhythmias  Abdomen: Soft, NT, ND  Skin: The skin is warm and dry. No jaundice.  Ext: No c/c/e.  : deferred    Labs/Studies: see chart    Impression/Plan:   1. To OR for right PCNL soon.  Risks/benefits reviewed consents on chart.

## 2019-08-15 NOTE — PRE-PROCEDURE INSTRUCTIONS
Pre op instructions reviewed with patient per phone.    To confirm, Your surgeon has instructed you:  Surgery is scheduled 08/20/19 at per md.      [facility] 1[facility] main lobby by per md.   Pre admit office to call afternoon prior to surgery with final arrival time      INSTRUCTIONS IMPORTANT!!!  ¨ Do not eat, drink, or smoke after 12 midnight-including water. OK to brush teeth, no gum, candy or mints!    ¨ Take only these medicines with a small swallow of water-morning of surgery.  N/A  ____  Do not wear makeup, including mascara.  ____  No powder, lotions or creams to surgical area.  ____  Please remove all jewelry, including piercings and leave at home.  ____  No money or valuables needed. Please leave at home.  ____  Please bring identification and insurance information to hospital.  ____  If going home the same day, arrange for a ride home. You will not be able to   drive if Anesthesia was used.  ____  Children, under 12 years old, must remain in the waiting room with an adult.  They are not allowed in patient areas.  ____  Wear loose fitting clothing. Allow for dressings, bandages.  ____  Stop Aspirin, Ibuprofen, Motrin and Aleve at least 5-7 days before surgery, unless otherwise instructed by your doctor, or the nurse.   You MAY use Tylenol/acetaminophen until day of surgery.  ____  If you take diabetic medication, do not take am of surgery unless instructed by   Doctor.  ____ Stop taking any Fish Oil supplement or any Vitamins that contain Vitamin E at least 5 days prior to surgery.          Bathing Instructions-- The night before surgery and the morning prior to coming to the hospital:   -Do not shave the surgical area.   -Shower and wash your hair and body as usual with anti-bacterial  soap and shampoo.   -Rinse your hair and body completely.   -Use one packet of hibiclens to wash the surgical site (using your hand) gently for 5 minutes.  Do not scrub you skin  too hard.   -Do not use hibiclens on your head, face, or genitals.   -Do not wash with anti-bacterial soap after you use the hibiclens.   -Rinse your body thoroughly.   -Dry with clean, soft towel.  Do not use lotion, cream, deodorant, or powders on   the surgical site.    Use antibacterial soap in place of hibiclens if your surgery is on the head, face or genitals.         Surgical Site Infection    Prevention of surgical site infections:     -Keep incisions clean and dry.   -Do not soak/submerge incisions in water until completely healed.   -Do not apply lotions, powders, creams, or deodorants to site.   -Always make sure hands are cleaned with antibacterial soap/ alcohol-based   prior to touching the surgical site.  (This includes doctors, nurses, staff, and yourself.)    Signs and symptoms:   -Redness and pain around the area where you had surgery   -Drainage of cloudy fluid from your surgical wound   -Fever over 100.4  I have read or had read and explained to me, and understand the above information.

## 2019-08-19 ENCOUNTER — TELEPHONE (OUTPATIENT)
Dept: UROLOGY | Facility: CLINIC | Age: 39
End: 2019-08-19

## 2019-08-19 ENCOUNTER — HOSPITAL ENCOUNTER (INPATIENT)
Facility: HOSPITAL | Age: 39
LOS: 2 days | Discharge: HOME | DRG: 698 | End: 2019-08-22
Attending: EMERGENCY MEDICINE | Admitting: INTERNAL MEDICINE
Payer: COMMERCIAL

## 2019-08-19 DIAGNOSIS — A41.9 SEPSIS: Primary | ICD-10-CM

## 2019-08-19 DIAGNOSIS — N39.0 URINARY TRACT INFECTION WITHOUT HEMATURIA, SITE UNSPECIFIED: ICD-10-CM

## 2019-08-19 LAB
ALBUMIN SERPL BCP-MCNC: 3.7 G/DL (ref 3.5–5.2)
ALP SERPL-CCNC: 89 U/L (ref 55–135)
ALT SERPL W/O P-5'-P-CCNC: 17 U/L (ref 10–44)
ANION GAP SERPL CALC-SCNC: 12 MMOL/L (ref 8–16)
AST SERPL-CCNC: 15 U/L (ref 10–40)
BACTERIA #/AREA URNS HPF: ABNORMAL /HPF
BASOPHILS # BLD AUTO: 0.03 K/UL (ref 0–0.2)
BASOPHILS NFR BLD: 0.2 % (ref 0–1.9)
BILIRUB SERPL-MCNC: 0.8 MG/DL (ref 0.1–1)
BILIRUB UR QL STRIP: NEGATIVE
BUN SERPL-MCNC: 11 MG/DL (ref 6–20)
CALCIUM SERPL-MCNC: 9.6 MG/DL (ref 8.7–10.5)
CHLORIDE SERPL-SCNC: 101 MMOL/L (ref 95–110)
CLARITY UR: CLEAR
CO2 SERPL-SCNC: 20 MMOL/L (ref 23–29)
COLOR UR: YELLOW
CREAT SERPL-MCNC: 1 MG/DL (ref 0.5–1.4)
DIFFERENTIAL METHOD: ABNORMAL
EOSINOPHIL # BLD AUTO: 0 K/UL (ref 0–0.5)
EOSINOPHIL NFR BLD: 0.1 % (ref 0–8)
ERYTHROCYTE [DISTWIDTH] IN BLOOD BY AUTOMATED COUNT: 12.8 % (ref 11.5–14.5)
EST. GFR  (AFRICAN AMERICAN): >60 ML/MIN/1.73 M^2
EST. GFR  (NON AFRICAN AMERICAN): >60 ML/MIN/1.73 M^2
GLUCOSE SERPL-MCNC: 106 MG/DL (ref 70–110)
GLUCOSE UR QL STRIP: NEGATIVE
HCT VFR BLD AUTO: 38.7 % (ref 37–48.5)
HGB BLD-MCNC: 13 G/DL (ref 12–16)
HGB UR QL STRIP: ABNORMAL
HYALINE CASTS #/AREA URNS LPF: 0 /LPF
KETONES UR QL STRIP: NEGATIVE
LACTATE SERPL-SCNC: 0.7 MMOL/L (ref 0.5–2.2)
LACTATE SERPL-SCNC: 1.1 MMOL/L (ref 0.5–2.2)
LEUKOCYTE ESTERASE UR QL STRIP: ABNORMAL
LYMPHOCYTES # BLD AUTO: 0.8 K/UL (ref 1–4.8)
LYMPHOCYTES NFR BLD: 4.4 % (ref 18–48)
MCH RBC QN AUTO: 31.6 PG (ref 27–31)
MCHC RBC AUTO-ENTMCNC: 33.6 G/DL (ref 32–36)
MCV RBC AUTO: 94 FL (ref 82–98)
MICROSCOPIC COMMENT: ABNORMAL
MONOCYTES # BLD AUTO: 1.6 K/UL (ref 0.3–1)
MONOCYTES NFR BLD: 8.9 % (ref 4–15)
NEUTROPHILS # BLD AUTO: 15.6 K/UL (ref 1.8–7.7)
NEUTROPHILS NFR BLD: 86.6 % (ref 38–73)
NITRITE UR QL STRIP: POSITIVE
PH UR STRIP: 7 [PH] (ref 5–8)
PLATELET # BLD AUTO: 386 K/UL (ref 150–350)
PMV BLD AUTO: 10.7 FL (ref 9.2–12.9)
POTASSIUM SERPL-SCNC: 3.6 MMOL/L (ref 3.5–5.1)
PROCALCITONIN SERPL IA-MCNC: 0.21 NG/ML
PROT SERPL-MCNC: 7.9 G/DL (ref 6–8.4)
PROT UR QL STRIP: ABNORMAL
RBC # BLD AUTO: 4.12 M/UL (ref 4–5.4)
RBC #/AREA URNS HPF: 7 /HPF (ref 0–4)
SODIUM SERPL-SCNC: 133 MMOL/L (ref 136–145)
SP GR UR STRIP: 1.01 (ref 1–1.03)
URN SPEC COLLECT METH UR: ABNORMAL
UROBILINOGEN UR STRIP-ACNC: NEGATIVE EU/DL
WBC # BLD AUTO: 18.03 K/UL (ref 3.9–12.7)
WBC #/AREA URNS HPF: 16 /HPF (ref 0–5)
YEAST URNS QL MICRO: ABNORMAL

## 2019-08-19 PROCEDURE — 96375 TX/PRO/DX INJ NEW DRUG ADDON: CPT | Performed by: EMERGENCY MEDICINE

## 2019-08-19 PROCEDURE — 96361 HYDRATE IV INFUSION ADD-ON: CPT

## 2019-08-19 PROCEDURE — 87040 BLOOD CULTURE FOR BACTERIA: CPT

## 2019-08-19 PROCEDURE — 96361 HYDRATE IV INFUSION ADD-ON: CPT | Performed by: EMERGENCY MEDICINE

## 2019-08-19 PROCEDURE — 93010 EKG 12-LEAD: ICD-10-PCS | Mod: ,,, | Performed by: INTERNAL MEDICINE

## 2019-08-19 PROCEDURE — 63600175 PHARM REV CODE 636 W HCPCS: Performed by: NURSE PRACTITIONER

## 2019-08-19 PROCEDURE — 87086 URINE CULTURE/COLONY COUNT: CPT

## 2019-08-19 PROCEDURE — 93005 ELECTROCARDIOGRAM TRACING: CPT

## 2019-08-19 PROCEDURE — G0378 HOSPITAL OBSERVATION PER HR: HCPCS

## 2019-08-19 PROCEDURE — 25000003 PHARM REV CODE 250: Performed by: NURSE PRACTITIONER

## 2019-08-19 PROCEDURE — 96367 TX/PROPH/DG ADDL SEQ IV INF: CPT

## 2019-08-19 PROCEDURE — 85025 COMPLETE CBC W/AUTO DIFF WBC: CPT

## 2019-08-19 PROCEDURE — 87088 URINE BACTERIA CULTURE: CPT

## 2019-08-19 PROCEDURE — 80053 COMPREHEN METABOLIC PANEL: CPT

## 2019-08-19 PROCEDURE — 81000 URINALYSIS NONAUTO W/SCOPE: CPT

## 2019-08-19 PROCEDURE — 36415 COLL VENOUS BLD VENIPUNCTURE: CPT

## 2019-08-19 PROCEDURE — 99285 EMERGENCY DEPT VISIT HI MDM: CPT | Mod: 25

## 2019-08-19 PROCEDURE — 63600175 PHARM REV CODE 636 W HCPCS: Performed by: EMERGENCY MEDICINE

## 2019-08-19 PROCEDURE — 87077 CULTURE AEROBIC IDENTIFY: CPT

## 2019-08-19 PROCEDURE — 63600175 PHARM REV CODE 636 W HCPCS: Performed by: PHYSICIAN ASSISTANT

## 2019-08-19 PROCEDURE — 96365 THER/PROPH/DIAG IV INF INIT: CPT

## 2019-08-19 PROCEDURE — 83605 ASSAY OF LACTIC ACID: CPT | Mod: 91

## 2019-08-19 PROCEDURE — 87186 SC STD MICRODIL/AGAR DIL: CPT

## 2019-08-19 PROCEDURE — 93010 ELECTROCARDIOGRAM REPORT: CPT | Mod: ,,, | Performed by: INTERNAL MEDICINE

## 2019-08-19 PROCEDURE — 84145 PROCALCITONIN (PCT): CPT

## 2019-08-19 RX ORDER — SODIUM CHLORIDE 9 MG/ML
INJECTION, SOLUTION INTRAVENOUS CONTINUOUS
Status: DISCONTINUED | OUTPATIENT
Start: 2019-08-19 | End: 2019-08-22 | Stop reason: HOSPADM

## 2019-08-19 RX ORDER — KETOROLAC TROMETHAMINE 30 MG/ML
30 INJECTION, SOLUTION INTRAMUSCULAR; INTRAVENOUS ONCE
Status: COMPLETED | OUTPATIENT
Start: 2019-08-19 | End: 2019-08-19

## 2019-08-19 RX ORDER — HYDROCODONE BITARTRATE AND ACETAMINOPHEN 5; 325 MG/1; MG/1
1 TABLET ORAL EVERY 6 HOURS PRN
Status: DISCONTINUED | OUTPATIENT
Start: 2019-08-19 | End: 2019-08-21

## 2019-08-19 RX ORDER — ONDANSETRON 2 MG/ML
4 INJECTION INTRAMUSCULAR; INTRAVENOUS
Status: DISCONTINUED | OUTPATIENT
Start: 2019-08-19 | End: 2019-08-19

## 2019-08-19 RX ORDER — ONDANSETRON 2 MG/ML
4 INJECTION INTRAMUSCULAR; INTRAVENOUS EVERY 8 HOURS PRN
Status: DISCONTINUED | OUTPATIENT
Start: 2019-08-19 | End: 2019-08-22 | Stop reason: HOSPADM

## 2019-08-19 RX ORDER — HYDROCODONE BITARTRATE AND ACETAMINOPHEN 5; 325 MG/1; MG/1
1 TABLET ORAL EVERY 6 HOURS PRN
Status: DISCONTINUED | OUTPATIENT
Start: 2019-08-19 | End: 2019-08-19

## 2019-08-19 RX ORDER — HYDROCODONE BITARTRATE AND ACETAMINOPHEN 10; 325 MG/1; MG/1
1 TABLET ORAL EVERY 6 HOURS PRN
Status: DISCONTINUED | OUTPATIENT
Start: 2019-08-19 | End: 2019-08-22 | Stop reason: HOSPADM

## 2019-08-19 RX ORDER — ACETAMINOPHEN 325 MG/1
650 TABLET ORAL EVERY 6 HOURS PRN
Status: DISCONTINUED | OUTPATIENT
Start: 2019-08-19 | End: 2019-08-22 | Stop reason: HOSPADM

## 2019-08-19 RX ADMIN — ACETAMINOPHEN 650 MG: 325 TABLET ORAL at 05:08

## 2019-08-19 RX ADMIN — KETOROLAC TROMETHAMINE 30 MG: 30 INJECTION, SOLUTION INTRAMUSCULAR at 08:08

## 2019-08-19 RX ADMIN — HYDROCODONE BITARTRATE AND ACETAMINOPHEN 1 TABLET: 10; 325 TABLET ORAL at 06:08

## 2019-08-19 RX ADMIN — SODIUM CHLORIDE 2163 ML: 0.9 INJECTION, SOLUTION INTRAVENOUS at 12:08

## 2019-08-19 RX ADMIN — SODIUM CHLORIDE: 0.9 INJECTION, SOLUTION INTRAVENOUS at 01:08

## 2019-08-19 RX ADMIN — CEFTRIAXONE 2 G: 2 INJECTION, SOLUTION INTRAVENOUS at 01:08

## 2019-08-19 RX ADMIN — PROMETHAZINE HYDROCHLORIDE 12.5 MG: 25 INJECTION INTRAMUSCULAR; INTRAVENOUS at 01:08

## 2019-08-19 NOTE — ED PROVIDER NOTES
SCRIBE #1 NOTE: I, Corinne Mack, am scribing for, and in the presence of, Watson Lopez Jr., MD. I have scribed the entire note.      History      Chief Complaint   Patient presents with    Fever     Sent from Dr. Way's office for fever. pt is scheduled to have kidney surgery tomorrow. temperature at home was 103 around 10- pt took tylenol PTA       Review of patient's allergies indicates:   Allergen Reactions    Morphine Shortness Of Breath        HPI   HPI    8/19/2019, 11:35 AM   History obtained from the patient      History of Present Illness: Shannan Alford is a 38 y.o. female patient who presents to the Emergency Department for fever (Tmax 103) which onset gradually today. Pt is scheduled to have a nephrostolithotomy by Dr. 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 Pt awoke with fever this morning and was instructed to present to the ED for further evaluation by Dr. Way's office. Symptoms are intermittent and moderate in severity. No mitigating or exacerbating factors reported. Associated sxs include nausea. Patient denies any chills, CP, SOB, V/D, abd pain, back pain, neck pain, HA, dizziness, and all other sxs at this time. Prior Tx includes Tylenol taken PTA with some relief. No further complaints or concerns at this time.         Arrival mode: Personal vehicle    PCP: Brent Nuñez MD       Past Medical History:  Past Medical History:   Diagnosis Date    ADHD (attention deficit hyperactivity disorder)     Depression     History of cervical cerclage     x2 pregnancies    Nephrolithiasis     PONV (postoperative nausea and vomiting)     mother & children       Past Surgical History:  Past Surgical History:   Procedure Laterality Date    Nephrostomy tube placement Right 8/1/2019    Performed by Zach Rodrigues MD at [facility] CATH LAB         Family History:  Family History   Problem Relation Age of Onset    Hypertension Mother     No  Known Problems Father        Social History:  Social History     Tobacco Use    Smoking status: Never Smoker    Smokeless tobacco: Never Used   Substance and Sexual Activity    Alcohol use: Yes     Frequency: Monthly or less     Comment: hold alcohol 72h prior to sx    Drug use: No    Sexual activity: Unknown       ROS   Review of Systems   Constitutional: Positive for fever (Tmax 103). Negative for chills.   Respiratory: Negative for cough and shortness of breath.    Cardiovascular: Negative for chest pain and leg swelling.   Gastrointestinal: Positive for nausea. Negative for abdominal pain, diarrhea and vomiting.   Musculoskeletal: Negative for back pain, neck pain and neck stiffness.   Skin: Negative for rash and wound.   Neurological: Negative for dizziness, light-headedness, numbness and headaches.   All other systems reviewed and are negative.    Physical Exam      Initial Vitals [08/19/19 1126]   BP Pulse Resp Temp SpO2   112/65 (!) 130 20 99.9 °F (37.7 °C) 97 %      MAP       --          Physical Exam  Nursing Notes and Vital Signs Reviewed.  Constitutional: Patient is in no acute distress. Well-developed and well-nourished.  Head: Atraumatic. Normocephalic.  Eyes: PERRL. EOM intact. Conjunctivae are not pale. No scleral icterus.  ENT: Mucous membranes are moist. Oropharynx is clear and symmetric.    Neck: Supple. Full ROM. No lymphadenopathy.  Cardiovascular: Tachycardic. Regular rhythm. No murmurs, rubs, or gallops. Distal pulses are 2+ and symmetric. Normal capillary refill less than 2 sec.  Pulmonary/Chest: No respiratory distress. Clear to auscultation bilaterally. No wheezing or rales.  Abdominal: Soft and non-distended.  There is no tenderness.  No rebound, guarding, or rigidity.   Genitourinary:  Mild right CVA tenderness. R nephrostomy tube in place.  Musculoskeletal: Moves all extremities. No obvious deformities. No edema.   Skin: Warm and dry. No mottling.  Neurological:  Alert, awake, and  "appropriate.  Normal speech.  No acute focal neurological deficits are appreciated.  Psychiatric: Normal affect. Good eye contact. Appropriate in content.    ED Course    Procedures  ED Vital Signs:  Vitals:    08/19/19 1126 08/19/19 1154 08/19/19 1155 08/19/19 1202   BP: 112/65  (!) 119/59 (!) 108/55   Pulse: (!) 130 (!) 114 (!) 113 (!) 112   Resp: 20 18 (!) 22    Temp: 99.9 °F (37.7 °C)      SpO2: 97% 98% 96% 96%   Weight: 72.1 kg (159 lb)      Height: 5' 3" (1.6 m)       08/19/19 1216 08/19/19 1231 08/19/19 1247 08/19/19 1303   BP: (!) 100/59 (!) 108/58 117/61 (!) 96/56   Pulse: (!) 116 108 109 108   Resp:       Temp:       SpO2: 96% 100% 100% 98%   Weight:       Height:        08/19/19 1316   BP: (!) 99/56   Pulse: (!) 112   Resp:    Temp:    SpO2: 100%   Weight:    Height:        Abnormal Lab Results:  Labs Reviewed   CBC W/ AUTO DIFFERENTIAL - Abnormal; Notable for the following components:       Result Value    WBC 18.03 (*)     Mean Corpuscular Hemoglobin 31.6 (*)     Platelets 386 (*)     Gran # (ANC) 15.6 (*)     Lymph # 0.8 (*)     Mono # 1.6 (*)     Gran% 86.6 (*)     Lymph% 4.4 (*)     All other components within normal limits   COMPREHENSIVE METABOLIC PANEL - Abnormal; Notable for the following components:    Sodium 133 (*)     CO2 20 (*)     All other components within normal limits   URINALYSIS, REFLEX TO URINE CULTURE - Abnormal; Notable for the following components:    Protein, UA 2+ (*)     Occult Blood UA 3+ (*)     Nitrite, UA Positive (*)     Leukocytes, UA 3+ (*)     All other components within normal limits    Narrative:     Preferred Collection Type->Urine, Clean Catch   URINALYSIS MICROSCOPIC - Abnormal; Notable for the following components:    RBC, UA 7 (*)     WBC, UA 16 (*)     Yeast, UA Occasional (*)     All other components within normal limits    Narrative:     Preferred Collection Type->Urine, Clean Catch   CULTURE, BLOOD   CULTURE, BLOOD   CULTURE, URINE   LACTIC ACID, PLASMA "   PROCALCITONIN   LACTIC ACID, PLASMA        All Lab Results:  Results for orders placed or performed during the hospital encounter of 08/19/19   CBC auto differential   Result Value Ref Range    WBC 18.03 (H) 3.90 - 12.70 K/uL    RBC 4.12 4.00 - 5.40 M/uL    Hemoglobin 13.0 12.0 - 16.0 g/dL    Hematocrit 38.7 37.0 - 48.5 %    Mean Corpuscular Volume 94 82 - 98 fL    Mean Corpuscular Hemoglobin 31.6 (H) 27.0 - 31.0 pg    Mean Corpuscular Hemoglobin Conc 33.6 32.0 - 36.0 g/dL    RDW 12.8 11.5 - 14.5 %    Platelets 386 (H) 150 - 350 K/uL    MPV 10.7 9.2 - 12.9 fL    Gran # (ANC) 15.6 (H) 1.8 - 7.7 K/uL    Lymph # 0.8 (L) 1.0 - 4.8 K/uL    Mono # 1.6 (H) 0.3 - 1.0 K/uL    Eos # 0.0 0.0 - 0.5 K/uL    Baso # 0.03 0.00 - 0.20 K/uL    Gran% 86.6 (H) 38.0 - 73.0 %    Lymph% 4.4 (L) 18.0 - 48.0 %    Mono% 8.9 4.0 - 15.0 %    Eosinophil% 0.1 0.0 - 8.0 %    Basophil% 0.2 0.0 - 1.9 %    Differential Method Automated    Comprehensive metabolic panel   Result Value Ref Range    Sodium 133 (L) 136 - 145 mmol/L    Potassium 3.6 3.5 - 5.1 mmol/L    Chloride 101 95 - 110 mmol/L    CO2 20 (L) 23 - 29 mmol/L    Glucose 106 70 - 110 mg/dL    BUN, Bld 11 6 - 20 mg/dL    Creatinine 1.0 0.5 - 1.4 mg/dL    Calcium 9.6 8.7 - 10.5 mg/dL    Total Protein 7.9 6.0 - 8.4 g/dL    Albumin 3.7 3.5 - 5.2 g/dL    Total Bilirubin 0.8 0.1 - 1.0 mg/dL    Alkaline Phosphatase 89 55 - 135 U/L    AST 15 10 - 40 U/L    ALT 17 10 - 44 U/L    Anion Gap 12 8 - 16 mmol/L    eGFR if African American >60 >60 mL/min/1.73 m^2    eGFR if non African American >60 >60 mL/min/1.73 m^2   Lactic acid, plasma #1   Result Value Ref Range    Lactate (Lactic Acid) 0.7 0.5 - 2.2 mmol/L   Urinalysis, Reflex to Urine Culture Urine, Clean Catch   Result Value Ref Range    Specimen UA Urine, Catheterized     Color, UA Yellow Yellow, Straw, Qing    Appearance, UA Clear Clear    pH, UA 7.0 5.0 - 8.0    Specific Gravity, UA 1.010 1.005 - 1.030    Protein, UA 2+ (A) Negative     Glucose, UA Negative Negative    Ketones, UA Negative Negative    Bilirubin (UA) Negative Negative    Occult Blood UA 3+ (A) Negative    Nitrite, UA Positive (A) Negative    Urobilinogen, UA Negative <2.0 EU/dL    Leukocytes, UA 3+ (A) Negative   Procalcitonin   Result Value Ref Range    Procalcitonin 0.21 <0.25 ng/mL   Urinalysis Microscopic   Result Value Ref Range    RBC, UA 7 (H) 0 - 4 /hpf    WBC, UA 16 (H) 0 - 5 /hpf    Bacteria Occasional None-Occ /hpf    Yeast, UA Occasional (A) None    Hyaline Casts, UA 0 0-1/lpf /lpf    Microscopic Comment SEE COMMENT        Imaging Results:  Imaging Results          X-Ray Chest AP Portable (Final result)  Result time 08/19/19 12:21:13    Final result by Louis Shi MD (08/19/19 12:21:13)                 Impression:      No acute abnormality.      Electronically signed by: Louis Shi  Date:    08/19/2019  Time:    12:21             Narrative:    EXAMINATION:  XR CHEST AP PORTABLE    CLINICAL HISTORY:  Sepsis;.    TECHNIQUE:  Single frontal portable view of the chest was performed.    COMPARISON:  08/12/2019    FINDINGS:  Support devices: Incompletely visualized right nephroureteral stent again noted.  Telemetry leads.  Bilateral renal stones again noted.    The lungs are clear, with normal appearance of pulmonary vasculature and no pleural effusion or pneumothorax.    The cardiac silhouette is normal in size. The hilar and mediastinal contours are unremarkable.    Bones are intact.                                 The EKG was ordered, reviewed, and independently interpreted by the ED provider.  Interpretation time: 1151  Rate: 112 BPM  Rhythm: sinus tachycardia  Interpretation: Anterior infarct. No STEMI.             The Emergency Provider reviewed the vital signs and test results, which are outlined above.    ED Discussion     12:37 PM: 30mL/kg IVF have been administered within 3 hours of identification of SIRS criteria. Vital signs were reviewed and a focal sepsis  perfusion assessment was performed.  Cardiopulmonary exam is unchanged.  Skin is non mild warm to the touch.  Capillary refill remains less than 2 sec with normal pulses.  Patient is stable.    1:08 PM: Dr. Lopez discussed the pt's case with Dr. Way (Urology) who recommends admission and IV abx.    1:20 PM: Re-evaluated pt. I have discussed test results, shared treatment plan, and the need for admission with patient and family at bedside. Pt and family express understanding at this time and agree with all information. All questions answered. Pt and family have no further questions or concerns at this time. Pt is ready for admit.    1:29 PM: Discussed case with LIZA Mcmullen ([facility]). Dr. Corrigan agrees with current care and management of pt and accepts admission.   Admitting Service: Hospital medicine   Admitting Physician: Dr. Corrigan  Admit to: Obs Med Tele    1:48 PM  Patient is stable nontoxic.  Patient has a history of recurrent urinary tract infections with infected staghorn calculus.  This resulted in nephrostomy tube for which he was pending surgery tomorrow for removal of the stone.  Patient subsequently spiked a fever of 103 in his dirty urine with 18,000 white count tachycardia.  She meets SIRS criteria this diagnosed with sepsis secondary to urinary tract infection.  Patient was started on Rocephin due to prior cultures at the recommendation of Urology he stated this started there Zosyn/Cipro or based upon prior cultures.  Last culture was susceptible to Rocephin.  [facility] graciously accepts the patient.  I have answered all questions of the family and the patient verbalized understanding agreement with the plan of care.    ED Medication(s):  Medications   cefTRIAXone (ROCEPHIN) 2 g in dextrose 5 % 50 mL IVPB (2 g Intravenous New Bag 8/19/19 0155)   promethazine (PHENERGAN) 12.5 mg in dextrose 5 % 50 mL IVPB (has no administration in time range)   sodium chloride 0.9% bolus  2,163 mL (0 mLs Intravenous Stopped 8/19/19 1320)          Medication List      ASK your doctor about these medications    desipramine 75 MG tablet  Commonly known as:  NORPRAMIN  Take 1 tablet (75 mg total) by mouth once daily.     HYDROcodone-acetaminophen 5-325 mg per tablet  Commonly known as:  NORCO  Take 1 tablet by mouth every 6 (six) hours as needed for Pain.                  Medical Decision Making    Medical Decision Making:   Clinical Tests:   Lab Tests: Ordered and Reviewed  Radiological Study: Ordered and Reviewed  Medical Tests: Ordered and Reviewed           Scribe Attestation:   Scribe #1: I performed the above scribed service and the documentation accurately describes the services I performed. I attest to the accuracy of the note 08/19/2019.    Attending:   Physician Attestation Statement for Scribe #1: I, Watson Lopez Jr., MD, personally performed the services described in this documentation, as scribed by Corinne Mack, in my presence, and it is both accurate and complete.          Clinical Impression       ICD-10-CM ICD-9-CM   1. Sepsis A41.9 038.9     995.91       Disposition:   Disposition: Placed in Observation  Condition: Fair           Watson Lopez Jr., MD  08/19/19 9163

## 2019-08-19 NOTE — TELEPHONE ENCOUNTER
Contacted pt and informed her that her surgery arrival time for tomorrow was at 7:30am; pt then informed me that she woke up with a temp of 103.  Spoke to MD and instructed pt to go to the ER for evaluation.

## 2019-08-19 NOTE — HPI
Ms Alford is a 38 year old female with PMHx of kidney stones who presented to [facility] Emergency Room from Dr Way's clinic with fever that started this AM. Associated symptoms include right flank area pain and nausea. Denies associated symptoms of nausea, chest pain, shortness of breath or diaphoresis. CT on 8/3/2019 showed right-sided nephrostomy tube is present with right-sided ureteral stent. Multiple nonobstructing left-sided kidney stones are present.  She was scheduled for PCNL with Dr Way on tomorrow. Today UA positive for Nitrates, WBC 18.03. Dr Lopez decussed case with Dr Darby who recommend IV antibiotic. She is placed in Observation under the care of Hospital Medicine.

## 2019-08-19 NOTE — ASSESSMENT & PLAN NOTE
Place in observation   Blood culture X 2   Urine Culture   Rocephin   Consult urology   Tylenol for temp   30 ml/kg bolus given in ED

## 2019-08-19 NOTE — TELEPHONE ENCOUNTER
Patient was contacted and instructed to go to the ER due to her fever of 103.  Verbalized understanding.

## 2019-08-19 NOTE — H&P
[facility] Hospital Medicine  History & Physical    Patient Name: Shannan Alford  MRN: [id number]  Admission Date: 8/19/2019  Attending Physician: Watson Lopez Jr., MD   Primary Care Provider: Brent Nuñez MD         Patient information was obtained from patient and ER records.     Subjective:     Principal Problem:Sepsis    Chief Complaint:   Chief Complaint   Patient presents with    Fever     Sent from Dr. Way's office for fever. pt is scheduled to have kidney surgery tomorrow. temperature at home was 103 around 10- pt took tylenol PTA        HPI: Ms Alford is a 38 year old female with PMHx of kidney stones who presented to [facility] Emergency Room from Dr Way's clinic with fever that started this AM. Associated symptoms include right flank area pain and nausea. Denies associated symptoms of nausea, chest pain, shortness of breath or diaphoresis. CT on 8/3/2019 showed right-sided nephrostomy tube is present with right-sided ureteral stent. Multiple nonobstructing left-sided kidney stones are present.  She was scheduled for PCNL with Dr Way on tomorrow. Today UA positive for Nitrates, WBC 18.03. Dr Lopez decussed case with Dr Darby who recommend IV antibiotic. She is placed in Observation under the care of Hospital Medicine.     Past Medical History:   Diagnosis Date    ADHD (attention deficit hyperactivity disorder)     Depression     History of cervical cerclage     x2 pregnancies    Nephrolithiasis     PONV (postoperative nausea and vomiting)     mother & children       Past Surgical History:   Procedure Laterality Date    Nephrostomy tube placement Right 8/1/2019    Performed by Zach Rodrigues MD at [facility] CATH LAB       Review of patient's allergies indicates:   Allergen Reactions    Morphine Shortness Of Breath       No current facility-administered medications on file prior to encounter.      Current Outpatient Medications on File Prior to Encounter    Medication Sig    desipramine (NORPRAMIN) 75 MG tablet Take 1 tablet (75 mg total) by mouth once daily.    HYDROcodone-acetaminophen (NORCO) 5-325 mg per tablet Take 1 tablet by mouth every 6 (six) hours as needed for Pain.    [DISCONTINUED] ketorolac (TORADOL) 10 mg tablet Take 1 tablet (10 mg total) by mouth every 6 (six) hours as needed for Pain.     Family History     Problem Relation (Age of Onset)    Hypertension Mother    No Known Problems Father        Tobacco Use    Smoking status: Never Smoker    Smokeless tobacco: Never Used   Substance and Sexual Activity    Alcohol use: Yes     Frequency: Monthly or less     Comment: hold alcohol 72h prior to sx    Drug use: No    Sexual activity: Not on file     Review of Systems   Constitutional: Positive for fatigue and fever. Negative for chills.   HENT: Negative for congestion, rhinorrhea and sinus pressure.    Respiratory: Negative for apnea, cough, choking, chest tightness, shortness of breath, wheezing and stridor.    Cardiovascular: Negative for chest pain, palpitations and leg swelling.   Gastrointestinal: Negative for abdominal distention, abdominal pain, diarrhea, nausea and vomiting.   Endocrine: Negative for cold intolerance and heat intolerance.   Genitourinary: Positive for flank pain (Right ). Negative for difficulty urinating and hematuria.   Musculoskeletal: Negative for arthralgias and joint swelling.   Skin: Negative for color change, pallor and rash.   Neurological: Positive for weakness. Negative for dizziness, seizures, numbness and headaches.   Psychiatric/Behavioral: Negative for agitation. The patient is not nervous/anxious.      Objective:     Vital Signs (Most Recent):  Temp: 99.9 °F (37.7 °C) (08/19/19 1126)  Pulse: 108 (08/19/19 1502)  Resp: (!) 21 (08/19/19 1347)  BP: (!) 114/58 (08/19/19 1502)  SpO2: 98 % (08/19/19 1502) Vital Signs (24h Range):  Temp:  [99.9 °F (37.7 °C)] 99.9 °F (37.7 °C)  Pulse:  [107-130] 108  Resp:   [18-22] 21  SpO2:  [96 %-100 %] 98 %  BP: ()/(51-65) 114/58     Weight: 72.1 kg (159 lb)  Body mass index is 28.17 kg/m².    Physical Exam   Constitutional: She is oriented to person, place, and time. She has a sickly appearance. She appears ill. No distress.   HENT:   Head: Normocephalic and atraumatic.   Mouth/Throat: No oropharyngeal exudate.   Eyes: Right eye exhibits no discharge. Left eye exhibits no discharge.   Neck: No JVD present.   Cardiovascular: Exam reveals no gallop and no friction rub.   No murmur heard.  Pulmonary/Chest: No stridor. No respiratory distress. She has no wheezes. She has no rales. She exhibits no tenderness.   Abdominal: She exhibits no distension and no mass. There is no tenderness. There is no rebound and no guarding. No hernia.   Musculoskeletal: She exhibits no edema or deformity.   Rt flank tenderness    Neurological: She is alert and oriented to person, place, and time.   Skin: Skin is dry. Capillary refill takes less than 2 seconds. She is not diaphoretic.   Nursing note and vitals reviewed.          Significant Labs:   CBC:   Recent Labs   Lab 08/19/19  1153   WBC 18.03*   HGB 13.0   HCT 38.7   *     CMP:   Recent Labs   Lab 08/19/19  1153   *   K 3.6      CO2 20*      BUN 11   CREATININE 1.0   CALCIUM 9.6   PROT 7.9   ALBUMIN 3.7   BILITOT 0.8   ALKPHOS 89   AST 15   ALT 17   ANIONGAP 12   EGFRNONAA >60     Lactic Acid:   Recent Labs   Lab 08/19/19  1153   LACTATE 0.7     Urine Studies:   Recent Labs   Lab 08/19/19  1220   COLORU Yellow   APPEARANCEUA Clear   PHUR 7.0   SPECGRAV 1.010   PROTEINUA 2+*   GLUCUA Negative   KETONESU Negative   BILIRUBINUA Negative   OCCULTUA 3+*   NITRITE Positive*   UROBILINOGEN Negative   LEUKOCYTESUR 3+*   RBCUA 7*   WBCUA 16*   BACTERIA Occasional   HYALINECASTS 0       Significant Imaging:     Imaging Results          X-Ray Chest AP Portable (Final result)  Result time 08/19/19 12:21:13    Final result by Louis  CLAUDIA Shi MD (08/19/19 12:21:13)                 Impression:      No acute abnormality.      Electronically signed by: Louis Shi  Date:    08/19/2019  Time:    12:21             Narrative:    EXAMINATION:  XR CHEST AP PORTABLE    CLINICAL HISTORY:  Sepsis;.    TECHNIQUE:  Single frontal portable view of the chest was performed.    COMPARISON:  08/12/2019    FINDINGS:  Support devices: Incompletely visualized right nephroureteral stent again noted.  Telemetry leads.  Bilateral renal stones again noted.    The lungs are clear, with normal appearance of pulmonary vasculature and no pleural effusion or pneumothorax.    The cardiac silhouette is normal in size. The hilar and mediastinal contours are unremarkable.    Bones are intact.                              Assessment/Plan:     * Sepsis  Place in observation   Blood culture X 2   Urine Culture   Rocephin   Consult urology   Tylenol for temp   30 ml/kg bolus given in ED       UTI (urinary tract infection)  BC X 2   Urine Culture   Rocephin     Nephrolithiasis    Urology Consulted   30 ml/kg bolus given in ED  Continue IVF   Restart home pain medication       VTE Risk Mitigation (From admission, onward)        Ordered     Place sequential compression device  Until discontinued      08/19/19 0897             Jose Enrique Walter NP  Department of Hospital Medicine   [facility]

## 2019-08-19 NOTE — SUBJECTIVE & OBJECTIVE
Past Medical History:   Diagnosis Date    ADHD (attention deficit hyperactivity disorder)     Depression     History of cervical cerclage     x2 pregnancies    Nephrolithiasis     PONV (postoperative nausea and vomiting)     mother & children       Past Surgical History:   Procedure Laterality Date    Nephrostomy tube placement Right 8/1/2019    Performed by Zach Rodrigues MD at [facility] CATH LAB       Review of patient's allergies indicates:   Allergen Reactions    Morphine Shortness Of Breath       No current facility-administered medications on file prior to encounter.      Current Outpatient Medications on File Prior to Encounter   Medication Sig    desipramine (NORPRAMIN) 75 MG tablet Take 1 tablet (75 mg total) by mouth once daily.    HYDROcodone-acetaminophen (NORCO) 5-325 mg per tablet Take 1 tablet by mouth every 6 (six) hours as needed for Pain.    [DISCONTINUED] ketorolac (TORADOL) 10 mg tablet Take 1 tablet (10 mg total) by mouth every 6 (six) hours as needed for Pain.     Family History     Problem Relation (Age of Onset)    Hypertension Mother    No Known Problems Father        Tobacco Use    Smoking status: Never Smoker    Smokeless tobacco: Never Used   Substance and Sexual Activity    Alcohol use: Yes     Frequency: Monthly or less     Comment: hold alcohol 72h prior to sx    Drug use: No    Sexual activity: Not on file     Review of Systems   Constitutional: Positive for fatigue and fever. Negative for chills.   HENT: Negative for congestion, rhinorrhea and sinus pressure.    Respiratory: Negative for apnea, cough, choking, chest tightness, shortness of breath, wheezing and stridor.    Cardiovascular: Negative for chest pain, palpitations and leg swelling.   Gastrointestinal: Negative for abdominal distention, abdominal pain, diarrhea, nausea and vomiting.   Endocrine: Negative for cold intolerance and heat intolerance.   Genitourinary: Positive for flank pain (Right ). Negative for  difficulty urinating and hematuria.   Musculoskeletal: Negative for arthralgias and joint swelling.   Skin: Negative for color change, pallor and rash.   Neurological: Positive for weakness. Negative for dizziness, seizures, numbness and headaches.   Psychiatric/Behavioral: Negative for agitation. The patient is not nervous/anxious.      Objective:     Vital Signs (Most Recent):  Temp: 99.9 °F (37.7 °C) (08/19/19 1126)  Pulse: 108 (08/19/19 1502)  Resp: (!) 21 (08/19/19 1347)  BP: (!) 114/58 (08/19/19 1502)  SpO2: 98 % (08/19/19 1502) Vital Signs (24h Range):  Temp:  [99.9 °F (37.7 °C)] 99.9 °F (37.7 °C)  Pulse:  [107-130] 108  Resp:  [18-22] 21  SpO2:  [96 %-100 %] 98 %  BP: ()/(51-65) 114/58     Weight: 72.1 kg (159 lb)  Body mass index is 28.17 kg/m².    Physical Exam   Constitutional: She is oriented to person, place, and time. She has a sickly appearance. She appears ill. No distress.   HENT:   Head: Normocephalic and atraumatic.   Mouth/Throat: No oropharyngeal exudate.   Eyes: Right eye exhibits no discharge. Left eye exhibits no discharge.   Neck: No JVD present.   Cardiovascular: Exam reveals no gallop and no friction rub.   No murmur heard.  Pulmonary/Chest: No stridor. No respiratory distress. She has no wheezes. She has no rales. She exhibits no tenderness.   Abdominal: She exhibits no distension and no mass. There is no tenderness. There is no rebound and no guarding. No hernia.   Musculoskeletal: She exhibits no edema or deformity.   Rt flank tenderness    Neurological: She is alert and oriented to person, place, and time.   Skin: Skin is dry. Capillary refill takes less than 2 seconds. She is not diaphoretic.   Nursing note and vitals reviewed.          Significant Labs:   CBC:   Recent Labs   Lab 08/19/19  1153   WBC 18.03*   HGB 13.0   HCT 38.7   *     CMP:   Recent Labs   Lab 08/19/19  1153   *   K 3.6      CO2 20*      BUN 11   CREATININE 1.0   CALCIUM 9.6   PROT  7.9   ALBUMIN 3.7   BILITOT 0.8   ALKPHOS 89   AST 15   ALT 17   ANIONGAP 12   EGFRNONAA >60     Lactic Acid:   Recent Labs   Lab 08/19/19  1153   LACTATE 0.7     Urine Studies:   Recent Labs   Lab 08/19/19  1220   COLORU Yellow   APPEARANCEUA Clear   PHUR 7.0   SPECGRAV 1.010   PROTEINUA 2+*   GLUCUA Negative   KETONESU Negative   BILIRUBINUA Negative   OCCULTUA 3+*   NITRITE Positive*   UROBILINOGEN Negative   LEUKOCYTESUR 3+*   RBCUA 7*   WBCUA 16*   BACTERIA Occasional   HYALINECASTS 0       Significant Imaging:     Imaging Results          X-Ray Chest AP Portable (Final result)  Result time 08/19/19 12:21:13    Final result by Louis Shi MD (08/19/19 12:21:13)                 Impression:      No acute abnormality.      Electronically signed by: Louis Shi  Date:    08/19/2019  Time:    12:21             Narrative:    EXAMINATION:  XR CHEST AP PORTABLE    CLINICAL HISTORY:  Sepsis;.    TECHNIQUE:  Single frontal portable view of the chest was performed.    COMPARISON:  08/12/2019    FINDINGS:  Support devices: Incompletely visualized right nephroureteral stent again noted.  Telemetry leads.  Bilateral renal stones again noted.    The lungs are clear, with normal appearance of pulmonary vasculature and no pleural effusion or pneumothorax.    The cardiac silhouette is normal in size. The hilar and mediastinal contours are unremarkable.    Bones are intact.

## 2019-08-20 PROBLEM — E87.6 HYPOKALEMIA: Status: ACTIVE | Noted: 2019-08-20

## 2019-08-20 LAB
ALBUMIN SERPL BCP-MCNC: 2.4 G/DL (ref 3.5–5.2)
ALP SERPL-CCNC: 73 U/L (ref 55–135)
ALT SERPL W/O P-5'-P-CCNC: 19 U/L (ref 10–44)
ANION GAP SERPL CALC-SCNC: 8 MMOL/L (ref 8–16)
AST SERPL-CCNC: 16 U/L (ref 10–40)
BASOPHILS # BLD AUTO: 0.03 K/UL (ref 0–0.2)
BASOPHILS NFR BLD: 0.2 % (ref 0–1.9)
BILIRUB SERPL-MCNC: 0.3 MG/DL (ref 0.1–1)
BUN SERPL-MCNC: 8 MG/DL (ref 6–20)
CALCIUM SERPL-MCNC: 8.2 MG/DL (ref 8.7–10.5)
CHLORIDE SERPL-SCNC: 109 MMOL/L (ref 95–110)
CO2 SERPL-SCNC: 21 MMOL/L (ref 23–29)
CREAT SERPL-MCNC: 0.8 MG/DL (ref 0.5–1.4)
DIFFERENTIAL METHOD: ABNORMAL
EOSINOPHIL # BLD AUTO: 0.2 K/UL (ref 0–0.5)
EOSINOPHIL NFR BLD: 1 % (ref 0–8)
ERYTHROCYTE [DISTWIDTH] IN BLOOD BY AUTOMATED COUNT: 13.1 % (ref 11.5–14.5)
EST. GFR  (AFRICAN AMERICAN): >60 ML/MIN/1.73 M^2
EST. GFR  (NON AFRICAN AMERICAN): >60 ML/MIN/1.73 M^2
GLUCOSE SERPL-MCNC: 117 MG/DL (ref 70–110)
HCT VFR BLD AUTO: 32.4 % (ref 37–48.5)
HGB BLD-MCNC: 10.5 G/DL (ref 12–16)
LYMPHOCYTES # BLD AUTO: 1.1 K/UL (ref 1–4.8)
LYMPHOCYTES NFR BLD: 6.3 % (ref 18–48)
MCH RBC QN AUTO: 31.2 PG (ref 27–31)
MCHC RBC AUTO-ENTMCNC: 32.4 G/DL (ref 32–36)
MCV RBC AUTO: 96 FL (ref 82–98)
MONOCYTES # BLD AUTO: 1.8 K/UL (ref 0.3–1)
MONOCYTES NFR BLD: 10.3 % (ref 4–15)
NEUTROPHILS # BLD AUTO: 14.5 K/UL (ref 1.8–7.7)
NEUTROPHILS NFR BLD: 82.5 % (ref 38–73)
PLATELET # BLD AUTO: 294 K/UL (ref 150–350)
PMV BLD AUTO: 10.1 FL (ref 9.2–12.9)
POTASSIUM SERPL-SCNC: 3.3 MMOL/L (ref 3.5–5.1)
PROT SERPL-MCNC: 5.7 G/DL (ref 6–8.4)
RBC # BLD AUTO: 3.37 M/UL (ref 4–5.4)
SODIUM SERPL-SCNC: 138 MMOL/L (ref 136–145)
WBC # BLD AUTO: 17.57 K/UL (ref 3.9–12.7)

## 2019-08-20 PROCEDURE — 85025 COMPLETE CBC W/AUTO DIFF WBC: CPT

## 2019-08-20 PROCEDURE — 99254 PR INITIAL INPATIENT CONSULT,LEVL IV: ICD-10-PCS | Mod: ,,, | Performed by: UROLOGY

## 2019-08-20 PROCEDURE — 63600175 PHARM REV CODE 636 W HCPCS: Performed by: NURSE PRACTITIONER

## 2019-08-20 PROCEDURE — 25000003 PHARM REV CODE 250: Performed by: NURSE PRACTITIONER

## 2019-08-20 PROCEDURE — 63600175 PHARM REV CODE 636 W HCPCS: Performed by: PHYSICIAN ASSISTANT

## 2019-08-20 PROCEDURE — 36415 COLL VENOUS BLD VENIPUNCTURE: CPT

## 2019-08-20 PROCEDURE — 80053 COMPREHEN METABOLIC PANEL: CPT

## 2019-08-20 PROCEDURE — 96361 HYDRATE IV INFUSION ADD-ON: CPT | Performed by: EMERGENCY MEDICINE

## 2019-08-20 PROCEDURE — 21400001 HC TELEMETRY ROOM

## 2019-08-20 PROCEDURE — 11000001 HC ACUTE MED/SURG PRIVATE ROOM

## 2019-08-20 PROCEDURE — 99254 IP/OBS CNSLTJ NEW/EST MOD 60: CPT | Mod: ,,, | Performed by: UROLOGY

## 2019-08-20 RX ORDER — IBUPROFEN 400 MG/1
400 TABLET ORAL EVERY 6 HOURS PRN
Status: DISCONTINUED | OUTPATIENT
Start: 2019-08-20 | End: 2019-08-22 | Stop reason: HOSPADM

## 2019-08-20 RX ORDER — KETOROLAC TROMETHAMINE 30 MG/ML
30 INJECTION, SOLUTION INTRAMUSCULAR; INTRAVENOUS ONCE
Status: COMPLETED | OUTPATIENT
Start: 2019-08-20 | End: 2019-08-20

## 2019-08-20 RX ORDER — POTASSIUM CHLORIDE 20 MEQ/1
40 TABLET, EXTENDED RELEASE ORAL ONCE
Status: COMPLETED | OUTPATIENT
Start: 2019-08-20 | End: 2019-08-20

## 2019-08-20 RX ADMIN — IBUPROFEN 400 MG: 400 TABLET ORAL at 03:08

## 2019-08-20 RX ADMIN — CEFTRIAXONE 2 G: 2 INJECTION, SOLUTION INTRAVENOUS at 12:08

## 2019-08-20 RX ADMIN — HYDROCODONE BITARTRATE AND ACETAMINOPHEN 1 TABLET: 10; 325 TABLET ORAL at 02:08

## 2019-08-20 RX ADMIN — KETOROLAC TROMETHAMINE 30 MG: 30 INJECTION, SOLUTION INTRAMUSCULAR at 03:08

## 2019-08-20 RX ADMIN — POTASSIUM CHLORIDE 40 MEQ: 1500 TABLET, EXTENDED RELEASE ORAL at 02:08

## 2019-08-20 RX ADMIN — HYDROCODONE BITARTRATE AND ACETAMINOPHEN 1 TABLET: 5; 325 TABLET ORAL at 09:08

## 2019-08-20 RX ADMIN — SODIUM CHLORIDE: 0.9 INJECTION, SOLUTION INTRAVENOUS at 05:08

## 2019-08-20 NOTE — ASSESSMENT & PLAN NOTE
Place in observation- transitioned to Inpatient   Blood culture X 2 with results pending   Urine Culture with results pending   Rocephin   Consult urology   Tylenol for temp- pt afebrile   30 ml/kg bolus given in ED   -IV antibiotics

## 2019-08-20 NOTE — PLAN OF CARE
Problem: Adult Inpatient Plan of Care  Goal: Plan of Care Review  Outcome: Ongoing (interventions implemented as appropriate)  Pt remained free of injury during shift, stable condition, pain adequately controlled with PRN medication and sleeping between care, no acute distress, had low BP during shift (90s/50s), receiving IV fluids, receiving antibiotics, R nephrostomy CDI and producing clear yellow urine, on cardiac monitoring (SR-ST, HR 80-110s), and will continue to monitor. 24hr chart review performed.

## 2019-08-20 NOTE — H&P (VIEW-ONLY)
Chief Complaint: Right staghorn calculus; pyelonephritis    HPI:   8/20/19: Admitted yesterday with fever at home 103, elev WBC, +nit UA.  Surgery postponed.  Feeling better today was acutely ill yesterday.  8/12/19: Right nephroureterostomy in place anticipating PCNL soon.  Asks about norpramin and I have no contraindications to it.  8/1/19: 39 yo woman since age 18 has had trouble with renal stones, and has had multiple ESWL/URS for same, mostly on the right side.  Last imaging 10 years ago while pregnant and no interval followup.  Has had a UTI for some months now without resolution.  Referred to ER by PCP after presentation with 103+ fever.  Feeling much better now.  Consistent right flank pain.  No other abd/pelvic pain and no exac/rel factors.  No hematuria.  No urinary bother.  Normal sexual function.  CT shows bilateral renal stones; on right has multiple blunted and chronically atrophied calyces with a staghorn likely obstructive in those areas.  No cris abscess but suggestive of pyelonephritis with chronic obstruction, partial XGP?    Allergies:  Morphine    Medications:  See chart    Review of Systems:  General: No fever, chills, fatigability, or weight loss.  Skin: No rashes, itching, or changes in color or texture of skin.  Chest: Denies ALVAREZ, cyanosis, wheezing, cough, and sputum production.  Abdomen: Appetite fine. No weight loss. Denies diarrhea, abdominal pain, hematemesis, or blood in stool.  Musculoskeletal: No joint stiffness or swelling. Denies back pain.  : As above.  All other review of systems negative.    PMH:   has a past medical history of ADHD (attention deficit hyperactivity disorder), Depression, History of cervical cerclage, Nephrolithiasis, and PONV (postoperative nausea and vomiting).    PSH:   has a past surgical history that includes Fluoroscopy (Right, 8/1/2019). ESWL/URS.    FamHx: family history includes Hypertension in her mother; No Known Problems in her father.    SocHx:   reports that she has never smoked. She has never used smokeless tobacco. She reports that she drinks alcohol. She reports that she does not use drugs.      Physical Exam:  Vitals:    08/20/19 0708   BP: (!) 96/53   Pulse: 94   Resp: 18   Temp: 98.3 °F (36.8 °C)     General: A&Ox3, no apparent distress, no deformities  Neck: No masses, normal thyroid  Lungs: normal inspiration, no use of accessory muscles  Heart: normal pulse, no arrhythmias  Abdomen: Soft, NT, ND  Skin: The skin is warm and dry. No jaundice.  Ext: No c/c/e.  : deferred    Labs/Studies: see chart    Impression/Plan:   1. To OR for right PCNL in two weeks.  Risks/benefits reviewed consents on chart.  2. Treat as indicated for current UTI with Abx coverage extending to intended day of surgery in 2 weeks.

## 2019-08-20 NOTE — SUBJECTIVE & OBJECTIVE
Interval History: pt stable with current plan of care continued.  Urology following.  Leukocytosis continued with pt afebrile and vitals stable.      Review of Systems   Constitutional: Positive for fatigue and fever. Negative for chills.   HENT: Negative for congestion, rhinorrhea and sinus pressure.    Respiratory: Negative for apnea, cough, choking, chest tightness, shortness of breath, wheezing and stridor.    Cardiovascular: Negative for chest pain, palpitations and leg swelling.   Gastrointestinal: Negative for abdominal distention, abdominal pain, diarrhea, nausea and vomiting.   Endocrine: Negative for cold intolerance and heat intolerance.   Genitourinary: Positive for flank pain (Right ). Negative for difficulty urinating and hematuria.   Musculoskeletal: Negative for arthralgias and joint swelling.   Skin: Negative for color change, pallor and rash.   Neurological: Positive for weakness. Negative for dizziness, seizures, numbness and headaches.   Psychiatric/Behavioral: Negative for agitation. The patient is not nervous/anxious.      Objective:     Vital Signs (Most Recent):  Temp: 98.8 °F (37.1 °C) (08/20/19 1313)  Pulse: 101 (08/20/19 1313)  Resp: 18 (08/20/19 1313)  BP: 115/60 (08/20/19 1313)  SpO2: 98 % (08/20/19 0708) Vital Signs (24h Range):  Temp:  [97.4 °F (36.3 °C)-99.9 °F (37.7 °C)] 98.8 °F (37.1 °C)  Pulse:  [] 101  Resp:  [16-21] 18  SpO2:  [95 %-100 %] 98 %  BP: ()/(52-61) 115/60     Weight: 73.1 kg (161 lb 2.5 oz)  Body mass index is 28.55 kg/m².    Intake/Output Summary (Last 24 hours) at 8/20/2019 1509  Last data filed at 8/20/2019 1300  Gross per 24 hour   Intake 2887.92 ml   Output 2225 ml   Net 662.92 ml      Physical Exam   Constitutional: She is oriented to person, place, and time. She has a sickly appearance. She appears ill. No distress.   HENT:   Head: Normocephalic and atraumatic.   Mouth/Throat: No oropharyngeal exudate.   Eyes: Right eye exhibits no discharge. Left  eye exhibits no discharge.   Neck: No JVD present.   Cardiovascular: Exam reveals no gallop and no friction rub.   No murmur heard.  Pulmonary/Chest: No stridor. No respiratory distress. She has no wheezes. She has no rales. She exhibits no tenderness.   Abdominal: She exhibits no distension and no mass. There is no tenderness. There is no rebound and no guarding. No hernia.   Genitourinary:   Genitourinary Comments: R flank TTP    Musculoskeletal: She exhibits tenderness. She exhibits no edema or deformity.   Rt flank tenderness    Neurological: She is alert and oriented to person, place, and time.   Skin: Skin is warm and dry. Capillary refill takes less than 2 seconds. She is not diaphoretic.   Psychiatric: She has a normal mood and affect. Her behavior is normal.   Nursing note and vitals reviewed.      Significant Labs:   CBC:   Recent Labs   Lab 08/19/19  1153 08/20/19  1217   WBC 18.03* 17.57*   HGB 13.0 10.5*   HCT 38.7 32.4*   * 294     CMP:   Recent Labs   Lab 08/19/19  1153 08/20/19  1217   * 138   K 3.6 3.3*    109   CO2 20* 21*    117*   BUN 11 8   CREATININE 1.0 0.8   CALCIUM 9.6 8.2*   PROT 7.9 5.7*   ALBUMIN 3.7 2.4*   BILITOT 0.8 0.3   ALKPHOS 89 73   AST 15 16   ALT 17 19   ANIONGAP 12 8   EGFRNONAA >60 >60       Significant Imaging:   Imaging Results          X-Ray Chest AP Portable (Final result)  Result time 08/19/19 12:21:13    Final result by Louis Shi MD (08/19/19 12:21:13)                 Impression:      No acute abnormality.      Electronically signed by: Louis Shi  Date:    08/19/2019  Time:    12:21             Narrative:    EXAMINATION:  XR CHEST AP PORTABLE    CLINICAL HISTORY:  Sepsis;.    TECHNIQUE:  Single frontal portable view of the chest was performed.    COMPARISON:  08/12/2019    FINDINGS:  Support devices: Incompletely visualized right nephroureteral stent again noted.  Telemetry leads.  Bilateral renal stones again noted.    The lungs are  clear, with normal appearance of pulmonary vasculature and no pleural effusion or pneumothorax.    The cardiac silhouette is normal in size. The hilar and mediastinal contours are unremarkable.    Bones are intact.

## 2019-08-20 NOTE — HOSPITAL COURSE
Pt admitted to Observation and transferred to Inpatient status for Sepsis in the setting of UTI due to kidney stone. Pt treated with IV antibiotics, IV hydration, and analgesia prn.  Leukocytosis continued.  Potassium replaced.  Urology following with surgical intervention planned in 2 weeks.  Pt afebrile with stable vital signs. On 8/21/19, pt stable, afebrile, and leukocytosis (17.57>>12.91) trending down on current therapy.  Pt verbalized mild symptom improvement. Vital signs stable. Urine culture grew gram negative rods.  Blood cultures with no growth to date.  On 8/22/19, pt verbalized symptom improvement and states she is eager for discharge.  Vital signs stable with no signs of acute distress.  Leukocytosis resolved.  LFTs mildly elevated.  Potassium normalized.  Urine culture grew E. Coli with results reviewed.  Pt seen and examined on the date of discharge and deemed suitable for discharge to home.  Current medications resumed with Cipro prescribed.  Pt instructed to follow up with PCP and Urology for further evaluation and previously scheduled surgical intervention.

## 2019-08-20 NOTE — PLAN OF CARE
Pt sent to hospital for admission by Tomas Way MD secondary to pt becoming ill with symptoms of fever, right-sided flank pain, and nausea.  Pt was scheduled to have kidney stones removed on 8/20/2019 but surgery postponed due to pt becoming ill.  Pt is alert and oriented x 3.  Pt still complaining of pain and has noticeable tremor due to discomfort.  Pt reports that prior to admission she lived at home with her children and significant other and was independent with ADLs.  Pt denies any post hospital needs at this time.  CM provided a transitional care folder, information on advanced directives, information on pharmacy bedside delivery, and discharge planning begins on admission with contact information for any needs/questions.    D/C plan: home        08/20/19 8440   Discharge Assessment   Assessment Type Discharge Planning Assessment   Confirmed/corrected address and phone number on facesheet? Yes   Assessment information obtained from? Patient;Medical Record   Expected Length of Stay (days)   (tbd)   Communicated expected length of stay with patient/caregiver yes   Prior to hospitilization cognitive status: Alert/Oriented   Prior to hospitalization functional status: Independent   Current cognitive status: Alert/Oriented   Current Functional Status: Independent   Facility Arrived From: home   Lives With child(de), dependent;significant other   Able to Return to Prior Arrangements yes   Is patient able to care for self after discharge? Yes   Who are your caregiver(s) and their phone number(s)? Tato Radford, significant other: [phone]   Patient's perception of discharge disposition home or selfcare   Readmission Within the Last 30 Days no previous admission in last 30 days   Patient currently being followed by outpatient case management? No   Patient currently receives any other outside agency services? No   Equipment Currently Used at Home none   Do you have any problems affording any of your  prescribed medications? No   Is the patient taking medications as prescribed? yes   Does the patient have transportation home? Yes   Transportation Anticipated family or friend will provide;car, drives self   Does the patient receive services at the [facility]? No   Discharge Plan A Home   Discharge Plan B Home   DME Needed Upon Discharge  none   Patient/Family in Agreement with Plan yes

## 2019-08-21 LAB
ALBUMIN SERPL BCP-MCNC: 2.3 G/DL (ref 3.5–5.2)
ALP SERPL-CCNC: 92 U/L (ref 55–135)
ALT SERPL W/O P-5'-P-CCNC: 21 U/L (ref 10–44)
ANION GAP SERPL CALC-SCNC: 7 MMOL/L (ref 8–16)
AST SERPL-CCNC: 18 U/L (ref 10–40)
BACTERIA UR CULT: ABNORMAL
BASOPHILS # BLD AUTO: 0.02 K/UL (ref 0–0.2)
BASOPHILS NFR BLD: 0.2 % (ref 0–1.9)
BILIRUB SERPL-MCNC: 0.2 MG/DL (ref 0.1–1)
BUN SERPL-MCNC: 9 MG/DL (ref 6–20)
CALCIUM SERPL-MCNC: 8.4 MG/DL (ref 8.7–10.5)
CHLORIDE SERPL-SCNC: 112 MMOL/L (ref 95–110)
CO2 SERPL-SCNC: 20 MMOL/L (ref 23–29)
CREAT SERPL-MCNC: 0.7 MG/DL (ref 0.5–1.4)
DIFFERENTIAL METHOD: ABNORMAL
EOSINOPHIL # BLD AUTO: 0.2 K/UL (ref 0–0.5)
EOSINOPHIL NFR BLD: 1.5 % (ref 0–8)
ERYTHROCYTE [DISTWIDTH] IN BLOOD BY AUTOMATED COUNT: 13.1 % (ref 11.5–14.5)
EST. GFR  (AFRICAN AMERICAN): >60 ML/MIN/1.73 M^2
EST. GFR  (NON AFRICAN AMERICAN): >60 ML/MIN/1.73 M^2
GLUCOSE SERPL-MCNC: 84 MG/DL (ref 70–110)
HCT VFR BLD AUTO: 29.4 % (ref 37–48.5)
HGB BLD-MCNC: 9.6 G/DL (ref 12–16)
LYMPHOCYTES # BLD AUTO: 1.9 K/UL (ref 1–4.8)
LYMPHOCYTES NFR BLD: 14.8 % (ref 18–48)
MCH RBC QN AUTO: 31.1 PG (ref 27–31)
MCHC RBC AUTO-ENTMCNC: 32.7 G/DL (ref 32–36)
MCV RBC AUTO: 95 FL (ref 82–98)
MONOCYTES # BLD AUTO: 1.1 K/UL (ref 0.3–1)
MONOCYTES NFR BLD: 8.8 % (ref 4–15)
NEUTROPHILS # BLD AUTO: 9.7 K/UL (ref 1.8–7.7)
NEUTROPHILS NFR BLD: 75 % (ref 38–73)
PLATELET # BLD AUTO: 280 K/UL (ref 150–350)
PMV BLD AUTO: 10.6 FL (ref 9.2–12.9)
POTASSIUM SERPL-SCNC: 3.9 MMOL/L (ref 3.5–5.1)
PROT SERPL-MCNC: 5.5 G/DL (ref 6–8.4)
RBC # BLD AUTO: 3.09 M/UL (ref 4–5.4)
SODIUM SERPL-SCNC: 139 MMOL/L (ref 136–145)
WBC # BLD AUTO: 12.91 K/UL (ref 3.9–12.7)

## 2019-08-21 PROCEDURE — 80053 COMPREHEN METABOLIC PANEL: CPT

## 2019-08-21 PROCEDURE — 85025 COMPLETE CBC W/AUTO DIFF WBC: CPT

## 2019-08-21 PROCEDURE — 25000003 PHARM REV CODE 250: Performed by: NURSE PRACTITIONER

## 2019-08-21 PROCEDURE — 63600175 PHARM REV CODE 636 W HCPCS: Performed by: NURSE PRACTITIONER

## 2019-08-21 PROCEDURE — 11000001 HC ACUTE MED/SURG PRIVATE ROOM

## 2019-08-21 PROCEDURE — 36415 COLL VENOUS BLD VENIPUNCTURE: CPT

## 2019-08-21 PROCEDURE — 63600175 PHARM REV CODE 636 W HCPCS: Performed by: PHYSICIAN ASSISTANT

## 2019-08-21 RX ORDER — KETOROLAC TROMETHAMINE 30 MG/ML
30 INJECTION, SOLUTION INTRAMUSCULAR; INTRAVENOUS ONCE AS NEEDED
Status: COMPLETED | OUTPATIENT
Start: 2019-08-21 | End: 2019-08-21

## 2019-08-21 RX ORDER — HYDROCODONE BITARTRATE AND ACETAMINOPHEN 5; 325 MG/1; MG/1
1 TABLET ORAL EVERY 6 HOURS PRN
Status: DISCONTINUED | OUTPATIENT
Start: 2019-08-21 | End: 2019-08-22 | Stop reason: HOSPADM

## 2019-08-21 RX ADMIN — KETOROLAC TROMETHAMINE 30 MG: 30 INJECTION, SOLUTION INTRAMUSCULAR at 01:08

## 2019-08-21 RX ADMIN — SODIUM CHLORIDE: 0.9 INJECTION, SOLUTION INTRAVENOUS at 11:08

## 2019-08-21 RX ADMIN — SODIUM CHLORIDE: 0.9 INJECTION, SOLUTION INTRAVENOUS at 03:08

## 2019-08-21 RX ADMIN — ONDANSETRON 4 MG: 2 INJECTION INTRAMUSCULAR; INTRAVENOUS at 03:08

## 2019-08-21 RX ADMIN — CEFTRIAXONE 2 G: 2 INJECTION, SOLUTION INTRAVENOUS at 01:08

## 2019-08-21 RX ADMIN — HYDROCODONE BITARTRATE AND ACETAMINOPHEN 1 TABLET: 10; 325 TABLET ORAL at 03:08

## 2019-08-21 NOTE — PHYSICIAN QUERY
"PT Name: Shannan Alford  MR #: [id number]     Physician Query Form - Documentation Clarification      CDS: Fadumo Gonzalez RN, CCDS         Contact information :[phone]  [email or web address]       This form is a permanent document in the medical record.     Query Date: August 21, 2019    By submitting this query, we are merely seeking further clarification of documentation. Please utilize your independent clinical judgment when addressing the question(s) below.    The Medical record reflects the following:    Supporting Clinical Findings Location in Medical Record     "Sepsis"  "Rocephin   Consult urology   Tylenol for temp   30 ml/kg bolus given in ED"    "UTI (urinary tract infection)"  "Nephrolithiasis"  "Rt flank tenderness"  "CT on 8/3/2019 showed right-sided nephrostomy tube is present with right-sided ureteral stent."    "Right nephroureterostomy in place anticipating PCNL soon"       H&P 8/19/19            H&P 8/19/19              Nephrology consult 8/20/19                                                                                Doctor, Please specify diagnosis or diagnoses associated with above clinical findings.  Please clarify if there is any correlation between UTI  and nephrostomy tube with right-sided ureteral stent.           Are the conditions:      [x  ] Due to or associated with each other   [  ] Unrelated to each other   [  ] Other (Please Specify): _________________________   [  ] Clinically Undetermined                       "

## 2019-08-21 NOTE — PLAN OF CARE
Problem: Adult Inpatient Plan of Care  Goal: Plan of Care Review  Outcome: Ongoing (interventions implemented as appropriate)  Pt remained free of injury during shift, stable condition, pain adequately controlled with PRN medication and sleeping between care, no acute distress, receiving IV fluids, receiving antibiotics, R nephrostomy CDI and producing clear yellow urine, on cardiac monitoring (SR-ST, HR 80-100s), and will continue to monitor. 24hr chart review performed.

## 2019-08-21 NOTE — PLAN OF CARE
Problem: Adult Inpatient Plan of Care  Goal: Plan of Care Review  Outcome: Ongoing (interventions implemented as appropriate)  One time dose of IV Ketorolac given, relief noted. NADN. VSS. Will continue to monitor.

## 2019-08-21 NOTE — SUBJECTIVE & OBJECTIVE
Interval History: pt stable and continues to report mild fatigue, flank pain, and weakness.  Current therapy continued. Pain controlled on current regime.  Pt afebrile with vital signs stable. Urine culture grew gram negative rods.      Review of Systems   Constitutional: Positive for fatigue. Negative for chills and fever.   HENT: Negative for congestion, rhinorrhea and sinus pressure.    Respiratory: Negative for apnea, cough, choking, chest tightness, shortness of breath, wheezing and stridor.    Cardiovascular: Negative for chest pain, palpitations and leg swelling.   Gastrointestinal: Negative for abdominal distention, abdominal pain, diarrhea, nausea and vomiting.   Endocrine: Negative for cold intolerance and heat intolerance.   Genitourinary: Positive for flank pain (Right ). Negative for difficulty urinating and hematuria.   Musculoskeletal: Negative for arthralgias and joint swelling.   Skin: Negative for color change, pallor and rash.   Neurological: Positive for weakness. Negative for dizziness, seizures, numbness and headaches.   Psychiatric/Behavioral: Negative for agitation. The patient is not nervous/anxious.      Objective:     Vital Signs (Most Recent):  Temp: 97.5 °F (36.4 °C) (08/21/19 0746)  Pulse: 91 (08/21/19 0746)  Resp: 16 (08/21/19 0746)  BP: (!) 105/55 (08/21/19 0746)  SpO2: 100 % (08/21/19 0746) Vital Signs (24h Range):  Temp:  [97.5 °F (36.4 °C)-98.8 °F (37.1 °C)] 97.5 °F (36.4 °C)  Pulse:  [] 91  Resp:  [16-18] 16  SpO2:  [96 %-100 %] 100 %  BP: ()/(51-60) 105/55     Weight: 73.1 kg (161 lb 2.5 oz)  Body mass index is 28.55 kg/m².    Intake/Output Summary (Last 24 hours) at 8/21/2019 1147  Last data filed at 8/21/2019 1100  Gross per 24 hour   Intake 4327.07 ml   Output 2550 ml   Net 1777.07 ml      Physical Exam   Constitutional: She is oriented to person, place, and time. She appears well-developed and well-nourished. She has a sickly appearance. She does not appear ill.  No distress.   HENT:   Head: Normocephalic and atraumatic.   Mouth/Throat: No oropharyngeal exudate.   Eyes: Right eye exhibits no discharge. Left eye exhibits no discharge.   Neck: No JVD present.   Cardiovascular: Exam reveals no gallop and no friction rub.   No murmur heard.  Pulmonary/Chest: No stridor. No respiratory distress. She has no wheezes. She has no rales. She exhibits no tenderness.   Abdominal: She exhibits no distension and no mass. There is no tenderness. There is no rebound and no guarding. No hernia.   Genitourinary:   Genitourinary Comments: R flank TTP- Nephrostomy tube in place   Musculoskeletal: She exhibits tenderness. She exhibits no edema or deformity.   Rt flank tenderness    Neurological: She is alert and oriented to person, place, and time.   Skin: Skin is warm and dry. Capillary refill takes less than 2 seconds. She is not diaphoretic.   Psychiatric: She has a normal mood and affect. Her behavior is normal.   Nursing note and vitals reviewed.      Significant Labs:   CBC:   Recent Labs   Lab 08/19/19  1153 08/20/19  1217 08/21/19  0659   WBC 18.03* 17.57* 12.91*   HGB 13.0 10.5* 9.6*   HCT 38.7 32.4* 29.4*   * 294 280     CMP:   Recent Labs   Lab 08/19/19  1153 08/20/19  1217 08/21/19  0659   * 138 139   K 3.6 3.3* 3.9    109 112*   CO2 20* 21* 20*    117* 84   BUN 11 8 9   CREATININE 1.0 0.8 0.7   CALCIUM 9.6 8.2* 8.4*   PROT 7.9 5.7* 5.5*   ALBUMIN 3.7 2.4* 2.3*   BILITOT 0.8 0.3 0.2   ALKPHOS 89 73 92   AST 15 16 18   ALT 17 19 21   ANIONGAP 12 8 7*   EGFRNONAA >60 >60 >60       Significant Imaging:   Imaging Results          X-Ray Chest AP Portable (Final result)  Result time 08/19/19 12:21:13    Final result by Louis Shi MD (08/19/19 12:21:13)                 Impression:      No acute abnormality.      Electronically signed by: Louis Shi  Date:    08/19/2019  Time:    12:21             Narrative:    EXAMINATION:  XR CHEST AP PORTABLE    CLINICAL  HISTORY:  Sepsis;.    TECHNIQUE:  Single frontal portable view of the chest was performed.    COMPARISON:  08/12/2019    FINDINGS:  Support devices: Incompletely visualized right nephroureteral stent again noted.  Telemetry leads.  Bilateral renal stones again noted.    The lungs are clear, with normal appearance of pulmonary vasculature and no pleural effusion or pneumothorax.    The cardiac silhouette is normal in size. The hilar and mediastinal contours are unremarkable.    Bones are intact.

## 2019-08-21 NOTE — ASSESSMENT & PLAN NOTE
Place in observation- transitioned to Inpatient   Blood culture X 2 with results with no growth to date   Urine Culture with results showed gram negative rods   Rocephin   Consult urology- pt to have surgical procedure in 2 weeks post acute process   Tylenol for temp- pt afebrile   30 ml/kg bolus given in ED   -IV antibiotics

## 2019-08-21 NOTE — PROGRESS NOTES
[facility] Hospital Medicine  Progress Note    Patient Name: Shannan Alford  MRN: [id number]  Patient Class: IP- Inpatient   Admission Date: 8/19/2019  Length of Stay: 1 days  Attending Physician: Tomas Corrigan MD  Primary Care Provider: Brent Nuñez MD        Subjective:     Principal Problem:Sepsis        HPI:  Ms Alford is a 38 year old female with PMHx of kidney stones who presented to [facility] Emergency Room from Dr Way's clinic with fever that started this AM. Associated symptoms include right flank area pain and nausea. Denies associated symptoms of nausea, chest pain, shortness of breath or diaphoresis. CT on 8/3/2019 showed right-sided nephrostomy tube is present with right-sided ureteral stent. Multiple nonobstructing left-sided kidney stones are present.  She was scheduled for PCNL with Dr Way on tomorrow. Today UA positive for Nitrates, WBC 18.03. Dr Lopez decussed case with Dr Darby who recommend IV antibiotic. She is placed in Observation under the care of Hospital Medicine.     Overview/Hospital Course:  Pt admitted to Observation and transferred to Inpatient status for Sepsis in the setting of UTI due to kidney stone. Pt treated with IV antibiotics, IV hydration, and analgesia prn.  Leukocytosis continued.  Potassium replaced.  Urology following with surgical intervention planned in 2 weeks.  Pt afebrile with stable vital signs. On 8/21/19, pt stable, afebrile, and leukocytosis (17.57>>12.91) trending down on current therapy.  Pt verbalized mild symptom improvement. Vital signs stable. Urine culture grew gram negative rods.  Blood cultures with no growth to date.      Interval History: pt stable and continues to report mild fatigue, flank pain, and weakness.  Current therapy continued. Pain controlled on current regime.  Pt afebrile with vital signs stable. Urine culture grew gram negative rods.      Review of Systems   Constitutional: Positive for fatigue.  Negative for chills and fever.   HENT: Negative for congestion, rhinorrhea and sinus pressure.    Respiratory: Negative for apnea, cough, choking, chest tightness, shortness of breath, wheezing and stridor.    Cardiovascular: Negative for chest pain, palpitations and leg swelling.   Gastrointestinal: Negative for abdominal distention, abdominal pain, diarrhea, nausea and vomiting.   Endocrine: Negative for cold intolerance and heat intolerance.   Genitourinary: Positive for flank pain (Right ). Negative for difficulty urinating and hematuria.   Musculoskeletal: Negative for arthralgias and joint swelling.   Skin: Negative for color change, pallor and rash.   Neurological: Positive for weakness. Negative for dizziness, seizures, numbness and headaches.   Psychiatric/Behavioral: Negative for agitation. The patient is not nervous/anxious.      Objective:     Vital Signs (Most Recent):  Temp: 97.5 °F (36.4 °C) (08/21/19 0746)  Pulse: 91 (08/21/19 0746)  Resp: 16 (08/21/19 0746)  BP: (!) 105/55 (08/21/19 0746)  SpO2: 100 % (08/21/19 0746) Vital Signs (24h Range):  Temp:  [97.5 °F (36.4 °C)-98.8 °F (37.1 °C)] 97.5 °F (36.4 °C)  Pulse:  [] 91  Resp:  [16-18] 16  SpO2:  [96 %-100 %] 100 %  BP: ()/(51-60) 105/55     Weight: 73.1 kg (161 lb 2.5 oz)  Body mass index is 28.55 kg/m².    Intake/Output Summary (Last 24 hours) at 8/21/2019 1147  Last data filed at 8/21/2019 1100  Gross per 24 hour   Intake 4327.07 ml   Output 2550 ml   Net 1777.07 ml      Physical Exam   Constitutional: She is oriented to person, place, and time. She appears well-developed and well-nourished. She has a sickly appearance. She does not appear ill. No distress.   HENT:   Head: Normocephalic and atraumatic.   Mouth/Throat: No oropharyngeal exudate.   Eyes: Right eye exhibits no discharge. Left eye exhibits no discharge.   Neck: No JVD present.   Cardiovascular: Exam reveals no gallop and no friction rub.   No murmur heard.  Pulmonary/Chest:  No stridor. No respiratory distress. She has no wheezes. She has no rales. She exhibits no tenderness.   Abdominal: She exhibits no distension and no mass. There is no tenderness. There is no rebound and no guarding. No hernia.   Genitourinary:   Genitourinary Comments: R flank TTP- Nephrostomy tube in place   Musculoskeletal: She exhibits tenderness. She exhibits no edema or deformity.   Rt flank tenderness    Neurological: She is alert and oriented to person, place, and time.   Skin: Skin is warm and dry. Capillary refill takes less than 2 seconds. She is not diaphoretic.   Psychiatric: She has a normal mood and affect. Her behavior is normal.   Nursing note and vitals reviewed.      Significant Labs:   CBC:   Recent Labs   Lab 08/19/19  1153 08/20/19  1217 08/21/19  0659   WBC 18.03* 17.57* 12.91*   HGB 13.0 10.5* 9.6*   HCT 38.7 32.4* 29.4*   * 294 280     CMP:   Recent Labs   Lab 08/19/19  1153 08/20/19  1217 08/21/19  0659   * 138 139   K 3.6 3.3* 3.9    109 112*   CO2 20* 21* 20*    117* 84   BUN 11 8 9   CREATININE 1.0 0.8 0.7   CALCIUM 9.6 8.2* 8.4*   PROT 7.9 5.7* 5.5*   ALBUMIN 3.7 2.4* 2.3*   BILITOT 0.8 0.3 0.2   ALKPHOS 89 73 92   AST 15 16 18   ALT 17 19 21   ANIONGAP 12 8 7*   EGFRNONAA >60 >60 >60       Significant Imaging:   Imaging Results          X-Ray Chest AP Portable (Final result)  Result time 08/19/19 12:21:13    Final result by Louis Shi MD (08/19/19 12:21:13)                 Impression:      No acute abnormality.      Electronically signed by: Louis Shi  Date:    08/19/2019  Time:    12:21             Narrative:    EXAMINATION:  XR CHEST AP PORTABLE    CLINICAL HISTORY:  Sepsis;.    TECHNIQUE:  Single frontal portable view of the chest was performed.    COMPARISON:  08/12/2019    FINDINGS:  Support devices: Incompletely visualized right nephroureteral stent again noted.  Telemetry leads.  Bilateral renal stones again noted.    The lungs are clear, with  normal appearance of pulmonary vasculature and no pleural effusion or pneumothorax.    The cardiac silhouette is normal in size. The hilar and mediastinal contours are unremarkable.    Bones are intact.                                Assessment/Plan:      * Sepsis  Place in observation- transitioned to Inpatient   Blood culture X 2 with results with no growth to date   Urine Culture with results showed gram negative rods   Rocephin   Consult urology- pt to have surgical procedure in 2 weeks post acute process   Tylenol for temp- pt afebrile   30 ml/kg bolus given in ED   -IV antibiotics       Hypokalemia  -K 3.3>>3.9  -replaced   -repeat labs in am       UTI (urinary tract infection)  BC X 2   Urine Culture   Rocephin   -IV hydration     Nephrolithiasis  Urology Consulted   30 ml/kg bolus given in ED  Continue IVF   Analgesia prn         VTE Risk Mitigation (From admission, onward)        Ordered     Place sequential compression device  Until discontinued      08/19/19 4378                Tatianna Pérez NP  Department of Hospital Medicine   [facility]

## 2019-08-22 VITALS
RESPIRATION RATE: 16 BRPM | DIASTOLIC BLOOD PRESSURE: 71 MMHG | HEART RATE: 86 BPM | BODY MASS INDEX: 28.56 KG/M2 | SYSTOLIC BLOOD PRESSURE: 121 MMHG | WEIGHT: 161.19 LBS | TEMPERATURE: 98 F | OXYGEN SATURATION: 100 % | HEIGHT: 63 IN

## 2019-08-22 DIAGNOSIS — N20.0 RENAL STONE: Primary | ICD-10-CM

## 2019-08-22 LAB
ALBUMIN SERPL BCP-MCNC: 2.3 G/DL (ref 3.5–5.2)
ALP SERPL-CCNC: 135 U/L (ref 55–135)
ALT SERPL W/O P-5'-P-CCNC: 72 U/L (ref 10–44)
ANION GAP SERPL CALC-SCNC: 9 MMOL/L (ref 8–16)
AST SERPL-CCNC: 66 U/L (ref 10–40)
BASOPHILS # BLD AUTO: 0.02 K/UL (ref 0–0.2)
BASOPHILS NFR BLD: 0.3 % (ref 0–1.9)
BILIRUB SERPL-MCNC: 0.3 MG/DL (ref 0.1–1)
BUN SERPL-MCNC: 7 MG/DL (ref 6–20)
CALCIUM SERPL-MCNC: 8.4 MG/DL (ref 8.7–10.5)
CHLORIDE SERPL-SCNC: 109 MMOL/L (ref 95–110)
CO2 SERPL-SCNC: 20 MMOL/L (ref 23–29)
CREAT SERPL-MCNC: 0.7 MG/DL (ref 0.5–1.4)
DIFFERENTIAL METHOD: ABNORMAL
EOSINOPHIL # BLD AUTO: 0.2 K/UL (ref 0–0.5)
EOSINOPHIL NFR BLD: 3 % (ref 0–8)
ERYTHROCYTE [DISTWIDTH] IN BLOOD BY AUTOMATED COUNT: 12.9 % (ref 11.5–14.5)
EST. GFR  (AFRICAN AMERICAN): >60 ML/MIN/1.73 M^2
EST. GFR  (NON AFRICAN AMERICAN): >60 ML/MIN/1.73 M^2
GLUCOSE SERPL-MCNC: 83 MG/DL (ref 70–110)
HCT VFR BLD AUTO: 28.9 % (ref 37–48.5)
HGB BLD-MCNC: 9.5 G/DL (ref 12–16)
LYMPHOCYTES # BLD AUTO: 1.9 K/UL (ref 1–4.8)
LYMPHOCYTES NFR BLD: 26.5 % (ref 18–48)
MCH RBC QN AUTO: 31 PG (ref 27–31)
MCHC RBC AUTO-ENTMCNC: 32.9 G/DL (ref 32–36)
MCV RBC AUTO: 94 FL (ref 82–98)
MONOCYTES # BLD AUTO: 0.6 K/UL (ref 0.3–1)
MONOCYTES NFR BLD: 8.5 % (ref 4–15)
NEUTROPHILS # BLD AUTO: 4.5 K/UL (ref 1.8–7.7)
NEUTROPHILS NFR BLD: 62 % (ref 38–73)
PLATELET # BLD AUTO: 275 K/UL (ref 150–350)
PMV BLD AUTO: 10.5 FL (ref 9.2–12.9)
POTASSIUM SERPL-SCNC: 3.8 MMOL/L (ref 3.5–5.1)
PROT SERPL-MCNC: 5.6 G/DL (ref 6–8.4)
RBC # BLD AUTO: 3.06 M/UL (ref 4–5.4)
SODIUM SERPL-SCNC: 138 MMOL/L (ref 136–145)
WBC # BLD AUTO: 7.33 K/UL (ref 3.9–12.7)

## 2019-08-22 PROCEDURE — 80053 COMPREHEN METABOLIC PANEL: CPT

## 2019-08-22 PROCEDURE — 85025 COMPLETE CBC W/AUTO DIFF WBC: CPT

## 2019-08-22 PROCEDURE — 36415 COLL VENOUS BLD VENIPUNCTURE: CPT

## 2019-08-22 RX ORDER — CIPROFLOXACIN 500 MG/1
500 TABLET ORAL EVERY 12 HOURS
Qty: 14 TABLET | Refills: 0 | Status: ON HOLD | OUTPATIENT
Start: 2019-08-22 | End: 2019-09-04 | Stop reason: HOSPADM

## 2019-08-22 RX ORDER — CIPROFLOXACIN 500 MG/1
500 TABLET ORAL EVERY 12 HOURS
Qty: 14 TABLET | Refills: 0 | Status: SHIPPED | OUTPATIENT
Start: 2019-08-22 | End: 2019-08-29

## 2019-08-22 NOTE — PLAN OF CARE
Problem: Adult Inpatient Plan of Care  Goal: Plan of Care Review  Outcome: Ongoing (interventions implemented as appropriate)  Remains free from injury. Denies pain. Nephrostomy tube draining. Fluids running as ordered. Vital signs stable. Chart reviewed. Will continue to monitor.

## 2019-08-22 NOTE — NURSING
Patient discharged via ambulatory to home. AVS reviewed and explained with pt, verbalized understanding. Paper prescription given to pt. Message sent to Dr. Way's office to schedule a hospital follow up appt and evaluation prior to scheduled procedure. IV removed. All belongings accompanied pt.

## 2019-08-22 NOTE — TELEPHONE ENCOUNTER
Spoke with pt, explained per Dr. Way there is not need for an appt prior to surgery. Procedure already scheduled for 9/3. Reviewed surgery instructions & scheduled Type & Screen for 9/2. Also explained that Dr. Way she will need to be on Antibiotics through her surgery. Will have Dr. Way send Rx to her pharmacy. Pt verbalized understanding.

## 2019-08-22 NOTE — DISCHARGE SUMMARY
[facility]  Hospital Medicine  Discharge Summary      Patient Name: Shannan Alford  MRN: [id number]  Admission Date: 8/19/2019  Hospital Length of Stay: 2 days  Discharge Date and Time: 8/22/2019 11:53 AM  Attending Physician: Dr. Tomas Corrigan    Discharging Provider: Tatianna Pérez NP  Primary Care Provider: Brent Nuñez MD      HPI:   Ms Alford is a 38 year old female with PMHx of kidney stones who presented to [facility] Emergency Room from Dr Way's clinic with fever that started this AM. Associated symptoms include right flank area pain and nausea. Denies associated symptoms of nausea, chest pain, shortness of breath or diaphoresis. CT on 8/3/2019 showed right-sided nephrostomy tube is present with right-sided ureteral stent. Multiple nonobstructing left-sided kidney stones are present.  She was scheduled for PCNL with Dr Way on tomorrow. Today UA positive for Nitrates, WBC 18.03. Dr Lopez decussed case with Dr Darby who recommend IV antibiotic. She is placed in Observation under the care of Hospital Medicine.     * No surgery found *      Hospital Course:   Pt admitted to Observation and transferred to Inpatient status for Sepsis in the setting of UTI due to kidney stone. Pt treated with IV antibiotics, IV hydration, and analgesia prn.  Leukocytosis continued.  Potassium replaced.  Urology following with surgical intervention planned in 2 weeks.  Pt afebrile with stable vital signs. On 8/21/19, pt stable, afebrile, and leukocytosis (17.57>>12.91) trending down on current therapy.  Pt verbalized mild symptom improvement. Vital signs stable. Urine culture grew gram negative rods.  Blood cultures with no growth to date.  On 8/22/19, pt verbalized symptom improvement and states she is eager for discharge.  Vital signs stable with no signs of acute distress.  Leukocytosis resolved.  LFTs mildly elevated.  Potassium normalized.  Nephrostomy tube site intact.  Urine culture grew E. Coli  with results reviewed.  Pt seen and examined on the date of discharge and deemed suitable for discharge to home.  Current medications resumed with Cipro prescribed.  Pt instructed to follow up with PCP and Urology for further evaluation and previously scheduled surgical intervention.       Consults:   Consults (From admission, onward)        Status Ordering Provider     Inpatient consult to Urology  Once     Provider:  Tomas Way IV, MD    Completed ALIYAH DURÁN          Final Active Diagnoses:    Diagnosis Date Noted POA    PRINCIPAL PROBLEM:  Sepsis [A41.9] 08/19/2019 Yes    Hypokalemia [E87.6] 08/20/2019 No    UTI (urinary tract infection) [N39.0] 08/19/2019 Yes    Nephrolithiasis [N20.0] 06/12/2019 Yes      Problems Resolved During this Admission:       Discharged Condition: stable    Disposition: Home or Self Care    Follow Up:  Follow-up Information     Brent Nuñez MD In 3 days.    Specialty:  Family Medicine  Why:  -hospital follow up   Contact information:  [address]  [place] [address]  [phone]             Tomas Way IV, MD In 1 week.    Specialty:  Urology  Why:  -hospital follow up and evaluation for previously scheduled procedure   Contact information:  [address] [place] [address] [address] [place] [address]  [phone]                 Patient Instructions:      Diet Adult Regular     Notify your health care provider if you experience any of the following:  temperature >100.4     Notify your health care provider if you experience any of the following:  persistent nausea and vomiting or diarrhea     Notify your health care provider if you experience any of the following:  redness, tenderness, or signs of infection (pain, swelling, redness, odor or green/yellow discharge around incision site)     Notify your health care provider if you experience any of the following:  increased confusion or weakness     Notify your health care provider if you experience any of the  following:  persistent dizziness, light-headedness, or visual disturbances     Notify your health care provider if you experience any of the following:  difficulty breathing or increased cough     Activity as tolerated       Significant Diagnostic Studies: Labs: CMP No results for input(s): NA, K, CL, CO2, GLU, BUN, CREATININE, CALCIUM, PROT, ALBUMIN, BILITOT, ALKPHOS, AST, ALT, ANIONGAP, ESTGFRAFRICA, EGFRNONAA in the last 48 hours. and CBC No results for input(s): WBC, HGB, HCT, PLT in the last 48 hours.    Pending Diagnostic Studies:     None         Imaging Results          X-Ray Chest AP Portable (Final result)  Result time 08/19/19 12:21:13    Final result by Louis Shi MD (08/19/19 12:21:13)                 Impression:      No acute abnormality.      Electronically signed by: Louis Shi  Date:    08/19/2019  Time:    12:21             Narrative:    EXAMINATION:  XR CHEST AP PORTABLE    CLINICAL HISTORY:  Sepsis;.    TECHNIQUE:  Single frontal portable view of the chest was performed.    COMPARISON:  08/12/2019    FINDINGS:  Support devices: Incompletely visualized right nephroureteral stent again noted.  Telemetry leads.  Bilateral renal stones again noted.    The lungs are clear, with normal appearance of pulmonary vasculature and no pleural effusion or pneumothorax.    The cardiac silhouette is normal in size. The hilar and mediastinal contours are unremarkable.    Bones are intact.                              Medications:  Reconciled Home Medications:      Medication List      START taking these medications    ciprofloxacin HCl 500 MG tablet  Commonly known as:  CIPRO  Take 1 tablet (500 mg total) by mouth every 12 (twelve) hours. for 7 days        CONTINUE taking these medications    desipramine 75 MG tablet  Commonly known as:  NORPRAMIN  Take 1 tablet (75 mg total) by mouth once daily.     HYDROcodone-acetaminophen 5-325 mg per tablet  Commonly known as:  NORCO  Take 1 tablet by mouth every 6  (six) hours as needed for Pain.            Indwelling Lines/Drains at time of discharge:   Lines/Drains/Airways     Drain                 Ureteral Drain/Stent 08/01/19 1507 Right ureter 8 Fr. 20 days                Time spent on the discharge of patient: > 30 minutes  Patient was seen and examined on the date of discharge and determined to be suitable for discharge.         Tatianna Pérez NP  Department of Hospital Medicine  [facility]

## 2019-08-22 NOTE — PHYSICIAN QUERY
"PT Name: Shannan Alford  MR #: [id number]    Physician Query Form - Cause and Effect Relationship Clarification      CDS: Fadumo Gonzalez RN, CCDS         Contact information :[phone]  [email or web address]       This form is a permanent document in the medical record.     Query Date: August 22, 2019    By submitting this query, we are merely seeking further clarification of documentation. Please utilize your independent clinical judgment when addressing the question(s) below.    The Medical record contains the following:  Supporting Clinical Findings   Location in record                                                                        "Sepsis"  "Blood culture X 2   Urine Culture   Rocephin   Consult urology   Tylenol for temp   30 ml/kg bolus given in ED"  "UTI"    "Urine Culture with results showed gram negative rods   Rocephin"     Urine culture     ESCHERICHIA COLI   >100,000 cfu/ml                                                                                                           H&P 8/19/19                  Hospital  Medicine  PN 8/21/19        Lab 8/19/19                         Please clarify if there is any correlation between Sepsis and ESCHERICHIA COLI .           Are the conditions:      [x  ] Due to or associated with each other   [  ] Unrelated to each other   [  ] Other (Please Specify): _________________________   [  ] Clinically Undetermined                                                                                                                                                                                             "

## 2019-08-24 LAB
BACTERIA BLD CULT: NORMAL
BACTERIA BLD CULT: NORMAL

## 2019-08-30 ENCOUNTER — TELEPHONE (OUTPATIENT)
Dept: UROLOGY | Facility: CLINIC | Age: 39
End: 2019-08-30

## 2019-08-30 NOTE — TELEPHONE ENCOUNTER
Contacted pt and informed her that her surgery arrival time for 9/3/19 was at 5:30am.  Pt verbalized understanding.

## 2019-09-01 ENCOUNTER — ANESTHESIA EVENT (OUTPATIENT)
Dept: SURGERY | Facility: HOSPITAL | Age: 39
End: 2019-09-01
Payer: COMMERCIAL

## 2019-09-02 ENCOUNTER — LAB VISIT (OUTPATIENT)
Dept: LAB | Facility: HOSPITAL | Age: 39
End: 2019-09-02
Attending: UROLOGY
Payer: COMMERCIAL

## 2019-09-02 DIAGNOSIS — N20.0 RENAL STONE: ICD-10-CM

## 2019-09-02 LAB
ABO + RH BLD: NORMAL
BLD GP AB SCN CELLS X3 SERPL QL: NORMAL

## 2019-09-02 PROCEDURE — 36415 COLL VENOUS BLD VENIPUNCTURE: CPT

## 2019-09-02 PROCEDURE — 86901 BLOOD TYPING SEROLOGIC RH(D): CPT

## 2019-09-03 ENCOUNTER — HOSPITAL ENCOUNTER (OUTPATIENT)
Facility: HOSPITAL | Age: 39
Discharge: HOME | End: 2019-09-04
Attending: UROLOGY | Admitting: UROLOGY
Payer: COMMERCIAL

## 2019-09-03 ENCOUNTER — ANESTHESIA (OUTPATIENT)
Dept: SURGERY | Facility: HOSPITAL | Age: 39
End: 2019-09-03
Payer: COMMERCIAL

## 2019-09-03 DIAGNOSIS — N20.0 NEPHROLITHIASIS: Primary | ICD-10-CM

## 2019-09-03 DIAGNOSIS — N20.0 RENAL STONE: ICD-10-CM

## 2019-09-03 DIAGNOSIS — N20.0 STAGHORN CALCULUS: ICD-10-CM

## 2019-09-03 DIAGNOSIS — N12 PYELONEPHRITIS: ICD-10-CM

## 2019-09-03 LAB
ANION GAP SERPL CALC-SCNC: 10 MMOL/L (ref 8–16)
B-HCG UR QL: NEGATIVE
BASOPHILS # BLD AUTO: 0.03 K/UL (ref 0–0.2)
BASOPHILS NFR BLD: 0.5 % (ref 0–1.9)
BUN SERPL-MCNC: 7 MG/DL (ref 6–20)
CALCIUM SERPL-MCNC: 8.7 MG/DL (ref 8.7–10.5)
CHLORIDE SERPL-SCNC: 104 MMOL/L (ref 95–110)
CO2 SERPL-SCNC: 25 MMOL/L (ref 23–29)
CREAT SERPL-MCNC: 0.9 MG/DL (ref 0.5–1.4)
DIFFERENTIAL METHOD: ABNORMAL
EOSINOPHIL # BLD AUTO: 0.1 K/UL (ref 0–0.5)
EOSINOPHIL NFR BLD: 2 % (ref 0–8)
ERYTHROCYTE [DISTWIDTH] IN BLOOD BY AUTOMATED COUNT: 13.6 % (ref 11.5–14.5)
EST. GFR  (AFRICAN AMERICAN): >60 ML/MIN/1.73 M^2
EST. GFR  (NON AFRICAN AMERICAN): >60 ML/MIN/1.73 M^2
GLUCOSE SERPL-MCNC: 113 MG/DL (ref 70–110)
HCT VFR BLD AUTO: 38.1 % (ref 37–48.5)
HGB BLD-MCNC: 12.3 G/DL (ref 12–16)
LYMPHOCYTES # BLD AUTO: 1.8 K/UL (ref 1–4.8)
LYMPHOCYTES NFR BLD: 27.3 % (ref 18–48)
MAGNESIUM SERPL-MCNC: 1.6 MG/DL (ref 1.6–2.6)
MCH RBC QN AUTO: 30.8 PG (ref 27–31)
MCHC RBC AUTO-ENTMCNC: 32.3 G/DL (ref 32–36)
MCV RBC AUTO: 95 FL (ref 82–98)
MONOCYTES # BLD AUTO: 0.4 K/UL (ref 0.3–1)
MONOCYTES NFR BLD: 6.4 % (ref 4–15)
NEUTROPHILS # BLD AUTO: 4.2 K/UL (ref 1.8–7.7)
NEUTROPHILS NFR BLD: 64 % (ref 38–73)
PHOSPHATE SERPL-MCNC: 2.6 MG/DL (ref 2.7–4.5)
PLATELET # BLD AUTO: 355 K/UL (ref 150–350)
PMV BLD AUTO: 10.1 FL (ref 9.2–12.9)
POTASSIUM SERPL-SCNC: 3.9 MMOL/L (ref 3.5–5.1)
RBC # BLD AUTO: 4 M/UL (ref 4–5.4)
SODIUM SERPL-SCNC: 139 MMOL/L (ref 136–145)
WBC # BLD AUTO: 6.52 K/UL (ref 3.9–12.7)

## 2019-09-03 PROCEDURE — 25000003 PHARM REV CODE 250: Performed by: NURSE ANESTHETIST, CERTIFIED REGISTERED

## 2019-09-03 PROCEDURE — 88300 SURGICAL PATH GROSS: CPT | Performed by: PATHOLOGY

## 2019-09-03 PROCEDURE — 82365 CALCULUS SPECTROSCOPY: CPT

## 2019-09-03 PROCEDURE — C1894 INTRO/SHEATH, NON-LASER: HCPCS | Performed by: UROLOGY

## 2019-09-03 PROCEDURE — 83735 ASSAY OF MAGNESIUM: CPT

## 2019-09-03 PROCEDURE — C1726 CATH, BAL DIL, NON-VASCULAR: HCPCS | Performed by: UROLOGY

## 2019-09-03 PROCEDURE — 25000003 PHARM REV CODE 250: Performed by: UROLOGY

## 2019-09-03 PROCEDURE — 88300 SURGICAL PATH GROSS: CPT | Mod: 26,,, | Performed by: PATHOLOGY

## 2019-09-03 PROCEDURE — 76000 PR  FLUOROSCOPE EXAMINATION: ICD-10-PCS | Mod: 26,59,, | Performed by: UROLOGY

## 2019-09-03 PROCEDURE — 50431 NJX PX NFROSGRM &/URTRGRM: CPT | Mod: 51,RT,, | Performed by: UROLOGY

## 2019-09-03 PROCEDURE — 63600175 PHARM REV CODE 636 W HCPCS: Performed by: NURSE ANESTHETIST, CERTIFIED REGISTERED

## 2019-09-03 PROCEDURE — 50081 PERQ NL/PL LITHOTRP CPLX>2CM: CPT | Mod: RT,,, | Performed by: UROLOGY

## 2019-09-03 PROCEDURE — C1758 CATHETER, URETERAL: HCPCS | Performed by: UROLOGY

## 2019-09-03 PROCEDURE — 71000033 HC RECOVERY, INTIAL HOUR: Performed by: UROLOGY

## 2019-09-03 PROCEDURE — 63600175 PHARM REV CODE 636 W HCPCS: Performed by: UROLOGY

## 2019-09-03 PROCEDURE — 50431 PR INJECTION PX NEPHROSTOGRAM &/ URETEROGRAM, EXISTING ACCESS, INCL GUID, S&I: ICD-10-PCS | Mod: 51,RT,, | Performed by: UROLOGY

## 2019-09-03 PROCEDURE — 25500020 PHARM REV CODE 255: Performed by: UROLOGY

## 2019-09-03 PROCEDURE — 37000008 HC ANESTHESIA 1ST 15 MINUTES: Performed by: UROLOGY

## 2019-09-03 PROCEDURE — 76000 FLUOROSCOPY <1 HR PHYS/QHP: CPT | Mod: 26,59,, | Performed by: UROLOGY

## 2019-09-03 PROCEDURE — C2617 STENT, NON-COR, TEM W/O DEL: HCPCS | Performed by: UROLOGY

## 2019-09-03 PROCEDURE — 37000009 HC ANESTHESIA EA ADD 15 MINS: Performed by: UROLOGY

## 2019-09-03 PROCEDURE — 88300 TISSUE SPECIMEN TO PATHOLOGY - SURGERY: ICD-10-PCS | Mod: 26,,, | Performed by: PATHOLOGY

## 2019-09-03 PROCEDURE — 63600175 PHARM REV CODE 636 W HCPCS: Performed by: ANESTHESIOLOGY

## 2019-09-03 PROCEDURE — 71000039 HC RECOVERY, EACH ADD'L HOUR: Performed by: UROLOGY

## 2019-09-03 PROCEDURE — C1769 GUIDE WIRE: HCPCS | Performed by: UROLOGY

## 2019-09-03 PROCEDURE — 94760 N-INVAS EAR/PLS OXIMETRY 1: CPT

## 2019-09-03 PROCEDURE — 36000706: Performed by: UROLOGY

## 2019-09-03 PROCEDURE — 81025 URINE PREGNANCY TEST: CPT

## 2019-09-03 PROCEDURE — 80048 BASIC METABOLIC PNL TOTAL CA: CPT

## 2019-09-03 PROCEDURE — 84100 ASSAY OF PHOSPHORUS: CPT

## 2019-09-03 PROCEDURE — 27201423 OPTIME MED/SURG SUP & DEVICES STERILE SUPPLY: Performed by: UROLOGY

## 2019-09-03 PROCEDURE — 85025 COMPLETE CBC W/AUTO DIFF WBC: CPT

## 2019-09-03 PROCEDURE — 36000707: Performed by: UROLOGY

## 2019-09-03 PROCEDURE — 50081 PR REMV KID STONE, 2+ CM, PERC: ICD-10-PCS | Mod: RT,,, | Performed by: UROLOGY

## 2019-09-03 DEVICE — STENT URETERAL UNIV 6FR 24CM: Type: IMPLANTABLE DEVICE | Site: URETER | Status: FUNCTIONAL

## 2019-09-03 RX ORDER — ROCURONIUM BROMIDE 10 MG/ML
INJECTION, SOLUTION INTRAVENOUS
Status: DISCONTINUED | OUTPATIENT
Start: 2019-09-03 | End: 2019-09-03

## 2019-09-03 RX ORDER — CEFAZOLIN SODIUM 2 G/50ML
2 SOLUTION INTRAVENOUS
Status: DISCONTINUED | OUTPATIENT
Start: 2019-09-03 | End: 2019-09-03 | Stop reason: HOSPADM

## 2019-09-03 RX ORDER — HYDROMORPHONE HYDROCHLORIDE 1 MG/ML
1 INJECTION, SOLUTION INTRAMUSCULAR; INTRAVENOUS; SUBCUTANEOUS EVERY 6 HOURS PRN
Status: DISCONTINUED | OUTPATIENT
Start: 2019-09-03 | End: 2019-09-04 | Stop reason: HOSPADM

## 2019-09-03 RX ORDER — SODIUM CHLORIDE 9 MG/ML
INJECTION, SOLUTION INTRAVENOUS CONTINUOUS
Status: DISCONTINUED | OUTPATIENT
Start: 2019-09-03 | End: 2019-09-04 | Stop reason: HOSPADM

## 2019-09-03 RX ORDER — BUPIVACAINE HYDROCHLORIDE 2.5 MG/ML
INJECTION, SOLUTION EPIDURAL; INFILTRATION; INTRACAUDAL
Status: DISCONTINUED | OUTPATIENT
Start: 2019-09-03 | End: 2019-09-03 | Stop reason: HOSPADM

## 2019-09-03 RX ORDER — MIDAZOLAM HYDROCHLORIDE 1 MG/ML
INJECTION, SOLUTION INTRAMUSCULAR; INTRAVENOUS
Status: DISCONTINUED | OUTPATIENT
Start: 2019-09-03 | End: 2019-09-03

## 2019-09-03 RX ORDER — GENTAMICIN SULFATE 40 MG/ML
240 INJECTION, SOLUTION INTRAMUSCULAR; INTRAVENOUS ONCE
Status: DISCONTINUED | OUTPATIENT
Start: 2019-09-03 | End: 2019-09-03

## 2019-09-03 RX ORDER — PROPOFOL 10 MG/ML
VIAL (ML) INTRAVENOUS
Status: DISCONTINUED | OUTPATIENT
Start: 2019-09-03 | End: 2019-09-03

## 2019-09-03 RX ORDER — FENTANYL CITRATE 50 UG/ML
25 INJECTION, SOLUTION INTRAMUSCULAR; INTRAVENOUS EVERY 5 MIN PRN
Status: COMPLETED | OUTPATIENT
Start: 2019-09-03 | End: 2019-09-03

## 2019-09-03 RX ORDER — TAMSULOSIN HYDROCHLORIDE 0.4 MG/1
0.4 CAPSULE ORAL NIGHTLY
Status: DISCONTINUED | OUTPATIENT
Start: 2019-09-03 | End: 2019-09-04 | Stop reason: HOSPADM

## 2019-09-03 RX ORDER — DIPHENHYDRAMINE HYDROCHLORIDE 50 MG/ML
25 INJECTION INTRAMUSCULAR; INTRAVENOUS EVERY 6 HOURS PRN
Status: DISCONTINUED | OUTPATIENT
Start: 2019-09-03 | End: 2019-09-04 | Stop reason: HOSPADM

## 2019-09-03 RX ORDER — FUROSEMIDE 10 MG/ML
INJECTION INTRAMUSCULAR; INTRAVENOUS
Status: DISCONTINUED | OUTPATIENT
Start: 2019-09-03 | End: 2019-09-03

## 2019-09-03 RX ORDER — CIPROFLOXACIN 500 MG/1
500 TABLET ORAL EVERY 12 HOURS
Status: DISCONTINUED | OUTPATIENT
Start: 2019-09-03 | End: 2019-09-03

## 2019-09-03 RX ORDER — OXYCODONE AND ACETAMINOPHEN 10; 325 MG/1; MG/1
1 TABLET ORAL EVERY 6 HOURS PRN
Status: DISCONTINUED | OUTPATIENT
Start: 2019-09-03 | End: 2019-09-04 | Stop reason: HOSPADM

## 2019-09-03 RX ORDER — ONDANSETRON 2 MG/ML
INJECTION INTRAMUSCULAR; INTRAVENOUS
Status: DISCONTINUED | OUTPATIENT
Start: 2019-09-03 | End: 2019-09-03

## 2019-09-03 RX ORDER — ONDANSETRON 2 MG/ML
4 INJECTION INTRAMUSCULAR; INTRAVENOUS DAILY PRN
Status: DISCONTINUED | OUTPATIENT
Start: 2019-09-03 | End: 2019-09-03 | Stop reason: HOSPADM

## 2019-09-03 RX ORDER — CIPROFLOXACIN 500 MG/1
500 TABLET ORAL 2 TIMES DAILY
Status: DISCONTINUED | OUTPATIENT
Start: 2019-09-03 | End: 2019-09-04 | Stop reason: HOSPADM

## 2019-09-03 RX ORDER — SODIUM CHLORIDE, SODIUM LACTATE, POTASSIUM CHLORIDE, CALCIUM CHLORIDE 600; 310; 30; 20 MG/100ML; MG/100ML; MG/100ML; MG/100ML
INJECTION, SOLUTION INTRAVENOUS CONTINUOUS PRN
Status: DISCONTINUED | OUTPATIENT
Start: 2019-09-03 | End: 2019-09-03

## 2019-09-03 RX ORDER — LIDOCAINE HYDROCHLORIDE 10 MG/ML
INJECTION, SOLUTION EPIDURAL; INFILTRATION; INTRACAUDAL; PERINEURAL
Status: DISCONTINUED | OUTPATIENT
Start: 2019-09-03 | End: 2019-09-03

## 2019-09-03 RX ORDER — DESIPRAMINE HYDROCHLORIDE 25 MG/1
75 TABLET ORAL DAILY
Status: DISCONTINUED | OUTPATIENT
Start: 2019-09-03 | End: 2019-09-03

## 2019-09-03 RX ORDER — FENTANYL CITRATE 50 UG/ML
INJECTION, SOLUTION INTRAMUSCULAR; INTRAVENOUS
Status: DISCONTINUED | OUTPATIENT
Start: 2019-09-03 | End: 2019-09-03

## 2019-09-03 RX ADMIN — OXYCODONE HYDROCHLORIDE AND ACETAMINOPHEN 1 TABLET: 10; 325 TABLET ORAL at 09:09

## 2019-09-03 RX ADMIN — SODIUM CHLORIDE, SODIUM LACTATE, POTASSIUM CHLORIDE, AND CALCIUM CHLORIDE: 600; 310; 30; 20 INJECTION, SOLUTION INTRAVENOUS at 07:09

## 2019-09-03 RX ADMIN — FENTANYL CITRATE 25 MCG: 0.05 INJECTION, SOLUTION INTRAMUSCULAR; INTRAVENOUS at 10:09

## 2019-09-03 RX ADMIN — LIDOCAINE HYDROCHLORIDE 50 MG: 10 INJECTION, SOLUTION EPIDURAL; INFILTRATION; INTRACAUDAL; PERINEURAL at 07:09

## 2019-09-03 RX ADMIN — HYDROMORPHONE HYDROCHLORIDE 1 MG: 1 INJECTION, SOLUTION INTRAMUSCULAR; INTRAVENOUS; SUBCUTANEOUS at 12:09

## 2019-09-03 RX ADMIN — SODIUM CHLORIDE: 0.9 INJECTION, SOLUTION INTRAVENOUS at 02:09

## 2019-09-03 RX ADMIN — ONDANSETRON 4 MG: 2 INJECTION, SOLUTION INTRAMUSCULAR; INTRAVENOUS at 09:09

## 2019-09-03 RX ADMIN — PROMETHAZINE HYDROCHLORIDE 12.5 MG: 25 INJECTION INTRAMUSCULAR; INTRAVENOUS at 10:09

## 2019-09-03 RX ADMIN — GENTAMICIN SULFATE 240 MG: 40 INJECTION, SOLUTION INTRAMUSCULAR; INTRAVENOUS at 07:09

## 2019-09-03 RX ADMIN — FUROSEMIDE 10 MG: 10 INJECTION, SOLUTION INTRAMUSCULAR; INTRAVENOUS at 09:09

## 2019-09-03 RX ADMIN — FENTANYL CITRATE 100 MCG: 50 INJECTION, SOLUTION INTRAMUSCULAR; INTRAVENOUS at 07:09

## 2019-09-03 RX ADMIN — MIDAZOLAM 2 MG: 1 INJECTION INTRAMUSCULAR; INTRAVENOUS at 07:09

## 2019-09-03 RX ADMIN — PROPOFOL 150 MG: 10 INJECTION, EMULSION INTRAVENOUS at 07:09

## 2019-09-03 RX ADMIN — CIPROFLOXACIN HYDROCHLORIDE 500 MG: 500 TABLET, FILM COATED ORAL at 09:09

## 2019-09-03 RX ADMIN — ROCURONIUM BROMIDE 50 MG: 10 INJECTION, SOLUTION INTRAVENOUS at 07:09

## 2019-09-03 RX ADMIN — CEFAZOLIN SODIUM 2 G: 2 SOLUTION INTRAVENOUS at 07:09

## 2019-09-03 RX ADMIN — TAMSULOSIN HYDROCHLORIDE 0.4 MG: 0.4 CAPSULE ORAL at 09:09

## 2019-09-03 RX ADMIN — CIPROFLOXACIN HYDROCHLORIDE 500 MG: 500 TABLET, FILM COATED ORAL at 02:09

## 2019-09-03 RX ADMIN — SODIUM CHLORIDE, SODIUM LACTATE, POTASSIUM CHLORIDE, AND CALCIUM CHLORIDE: 600; 310; 30; 20 INJECTION, SOLUTION INTRAVENOUS at 09:09

## 2019-09-03 RX ADMIN — HYDROMORPHONE HYDROCHLORIDE 1 MG: 1 INJECTION, SOLUTION INTRAMUSCULAR; INTRAVENOUS; SUBCUTANEOUS at 06:09

## 2019-09-03 RX ADMIN — ONDANSETRON 4 MG: 2 INJECTION INTRAMUSCULAR; INTRAVENOUS at 09:09

## 2019-09-03 NOTE — INTERVAL H&P NOTE
The patient has been examined and the H&P has been reviewed:    I concur with the findings and changes have been noted since the H&P was written: previous UTI has resolved, feeling well no complaints today.    Anesthesia/Surgery risks, benefits and alternative options discussed and understood by patient/family.          There are no hospital problems to display for this patient.

## 2019-09-03 NOTE — ANESTHESIA POSTPROCEDURE EVALUATION
Anesthesia Post Evaluation    Patient: Shannan Alford    Procedure(s) Performed: Procedure(s) (LRB):  NEPHROSTOLITHOTOMY, PERCUTANEOUS (Right)    Final Anesthesia Type: general  Patient location during evaluation: PACU  Patient participation: Yes- Able to Participate  Level of consciousness: awake and alert  Post-procedure vital signs: reviewed and stable  Pain management: adequate  Airway patency: patent  PONV status at discharge: No PONV  Anesthetic complications: no      Cardiovascular status: hemodynamically stable  Respiratory status: spontaneous ventilation  Hydration status: euvolemic  Follow-up not needed.          Vitals Value Taken Time   /60 9/3/2019 11:10 AM   Temp 36.9 °C (98.4 °F) 9/3/2019 11:10 AM   Pulse 86 9/3/2019 11:10 AM   Resp 18 9/3/2019 11:10 AM   SpO2 97 % 9/3/2019 11:10 AM         Event Time     Out of Recovery 11:00:00          Pain/Rut Score: Pain Rating Prior to Med Admin: 4 (9/3/2019 10:40 AM)  Pain Rating Post Med Admin: 2 (9/3/2019 11:10 AM)  Rut Score: 9 (9/3/2019 10:30 AM)

## 2019-09-03 NOTE — TRANSFER OF CARE
"Anesthesia Transfer of Care Note    Patient: Shannan Alford    Procedure(s) Performed: Procedure(s) (LRB):  NEPHROSTOLITHOTOMY, PERCUTANEOUS (Right)    Patient location: PACU    Anesthesia Type: general    Transport from OR: Transported from OR on room air with adequate spontaneous ventilation    Post pain: adequate analgesia    Post assessment: no apparent anesthetic complications    Post vital signs: stable    Level of consciousness: awake    Nausea/Vomiting: no nausea/vomiting    Complications: none    Transfer of care protocol was followed      Last vitals:   Visit Vitals  BP (!) 125/59 (BP Location: Right arm, Patient Position: Sitting)   Pulse 105   Temp 36.8 °C (98.2 °F) (Temporal)   Resp 18   Ht 5' 3" (1.6 m)   Wt 71.7 kg (158 lb 1.1 oz)   LMP 08/20/2019   SpO2 100%   Breastfeeding? No   BMI 28.00 kg/m²     "

## 2019-09-03 NOTE — OP NOTE
Date of Procedure: 09/03/2019    PREOPERATIVE DIAGNOSIS:  Right staghorn renal calculus.    POSTOPERATIVE DIAGNOSIS:  same.    PROCEDURES:     1.  Right percutaneous nephrolithotomy.     2.  Placement of double-J ureteral stent antegrade.     3.  Placement of nephrostomy tube.     4.  Antegrade nephrostogram.     5.  Fluoro less than 1 hour.    SURGEON:  Alex Way IV, M.D.    Assistants: None    Specimen: None    Prosthetics, Devices, Grafts: None    ANESTHESIA:  General endotracheal.    FINDINGS:  The patient had a collection of right renal stones largest >2 cm.  After access percutaneously, the stones were appreciated and broken up with the CyberWand and other instruments as needed and fragments extracted to the back table and sent to Pathology for examination.  All calices that could be examined during this PCNL were examined.  There may be a possibility that other fragments remain hidden from today's view that will require further treatment.  22 mod -> case was difficult due to multiple discrete large stones, with anatomy that made the medial upper pole stone removal difficult.    PROCEDURE IN DETAIL:  After proper consents were obtained, the patient was brought to the Operating Room.  She was prepped and draped in normal sterile fashion and provided with general endotracheal anesthesia in the prone position.  A 16-French Chauhan catheter was placed in the bladder prior to positioning with 10 mL sterile water in its balloon.  The patient had previously returned from Interventional Radiology where a nephrostomy tube had been passed through the kidney and down through the ureter and into the bladder.  An Amplatz Super Stiff wire was passed through this line, which was then withdrawn.  The skin was incised with an 11 blade and then the 9.5-French dual lumen dilator was used to allow introduction of a second wire, which was then passed down to the bladder and all this was observed under fluoroscopic guidance.   The dilator was withdrawn leaving 2 wires in place.  Over the Super Stiff wire, the balloon dilator was placed and dilation of a 30-Chinese track down into the kidney was performed without difficulty.  The access sheath was then passed over the balloon and the balloon withdrawn leaving the wire within the lumen of the sheath and the green safety wire outside the lumen, both going to the bladder.  The nephroscope was then brought to bear and a time was spent irrigating clot and gaining good visibility.      After this, the large stone at the UPJ was identified and eliminated with cyberwand.  Lower pole extensions of this stone were taken similarly.  It took a great deal of time to identify the upper pole medial stone but it was large and eliminated with its satellites.  The lateral upper pole calyx could not be identified so it was left for later staged procedure  More than 85 percent of the overall stone volume was eliminated.      The fluoroscope and antegrade nephrostogram were used to evaluate the collecting system of the right kidney.  No extravasation was observed. With nephrostogram, it did not appear that there were any filling defects or any other significant stones remaining except for the lateral upper pole stone.      Election was made to pass a 6-Chinese x 24 cm double-J ureteral stent.  The wire was back-loaded on the nephrostoscope and this stent was put in excellent position with a curl on both ends from the right renal pelvis down to the bladder using both visual and fluoroscopic guidance and thus the Super Stiff wire was withdrawn.      A councill-tip catheter was then passed through the sheath and 3cc sterile water was placed in it's balloon.  An antegrade nephrostogram was performed to confirm good position.  There was minimal extravasation from the kidney where the PCNL had been performed.  There were no filling defects and normal appearing calyces and ureter. The patient tolerated all this well  and there were no complications. The nephrostomy tube was sewn in place using 2- 0 nylon and sent to gravity drainage.  The skin around the tube was closed using 4-0 Monocryl. The wound was then cleaned and dressed and the patient was awakened and transferred to Recovery in stable condition.    BLOOD LOSS:  50 mL    BLOOD GIVEN:  None.    URINE OUTPUT:  None.    DRAINS:     1.  A 16-Swedish Chauhan catheter.     2.  Nephrostomy tube -- 20-Swedish Coventry tip catheter.     3.  A 6-Swedish x 24 cm right double-J ureteral stent.

## 2019-09-03 NOTE — ANESTHESIA RELEASE NOTE
"Anesthesia Release from PACU Note    Patient: Shannan Alford    Procedure(s) Performed: Procedure(s) (LRB):  NEPHROSTOLITHOTOMY, PERCUTANEOUS (Right)    Anesthesia type: general    Post pain: Adequate analgesia    Post assessment: no apparent anesthetic complications    Last Vitals:   Visit Vitals  BP (!) 125/59 (BP Location: Right arm, Patient Position: Sitting)   Pulse 105   Temp 36.8 °C (98.2 °F) (Temporal)   Resp 18   Ht 5' 3" (1.6 m)   Wt 71.7 kg (158 lb 1.1 oz)   LMP 08/20/2019   SpO2 100%   Breastfeeding? No   BMI 28.00 kg/m²       Post vital signs: stable    Level of consciousness: awake    Nausea/Vomiting: no nausea/no vomiting    Complications: none    Airway Patency: patent    Respiratory: unassisted    Cardiovascular: stable and blood pressure at baseline    Hydration: euvolemic       "

## 2019-09-03 NOTE — ANESTHESIA PREPROCEDURE EVALUATION
09/03/2019  Shannan Alford is a 38 y.o., female.    Pre-op Assessment    I have reviewed the Patient Summary Reports.    I have reviewed the Nursing Notes.   I have reviewed the Medications.     Review of Systems  Anesthesia Hx:  No problems with previous Anesthesia Denies Hx of Anesthetic complications  Neg history of prior surgery. Denies Family Hx of Anesthesia complications.   Denies Personal Hx of Anesthesia complications.   Social:  Non-Smoker, No Alcohol Use    Hematology/Oncology:  Hematology Normal   Oncology Normal     EENT/Dental:EENT/Dental Normal   Cardiovascular:  Cardiovascular Normal Exercise tolerance: good  NYHA Classification I ECG has been reviewed.    Pulmonary:  Pulmonary Normal    Renal/:   Chronic Renal Disease renal calculi    Hepatic/GI:  Hepatic/GI Normal    Musculoskeletal:  Musculoskeletal Normal    Neurological:  Neurology Normal    Endocrine:  Endocrine Normal    Dermatological:  Skin Normal    Psych:   depression          Physical Exam  General:  Well nourished    Airway/Jaw/Neck:  Airway Findings: Mouth Opening: Normal Tongue: Normal  General Airway Assessment: Adult  Mallampati: I  TM Distance: Normal, at least 6 cm        Eyes/Ears/Nose:  EYES/EARS/NOSE FINDINGS: Normal   Dental:  DENTAL FINDINGS: Normal   Chest/Lungs:  Chest/Lungs Findings: Clear to auscultation     Heart/Vascular:  Heart Findings: Rate: Normal  Rhythm: Regular Rhythm  Sounds: Normal  Heart murmur: negative Vascular Findings: Normal    Abdomen:  Abdomen Findings: Normal    Musculoskeletal:  Musculoskeletal Findings: Normal   Skin:  Skin Findings: Normal    Mental Status:  Mental Status Findings: Normal        Anesthesia Plan  Type of Anesthesia, risks & benefits discussed:  Anesthesia Type:  general  Patient's Preference:   Intra-op Monitoring Plan: standard ASA monitors  Intra-op Monitoring Plan  Comments:   Post Op Pain Control Plan: per primary service following discharge from PACU  Post Op Pain Control Plan Comments:   Induction:   IV  Beta Blocker:  Patient is not currently on a Beta-Blocker (No further documentation required).       Informed Consent: Patient understands risks and agrees with Anesthesia plan.  Questions answered. Anesthesia consent signed with patient.  ASA Score: 2     Day of Surgery Review of History & Physical: I have interviewed and examined the patient. I have reviewed the patient's H&P dated:    H&P update referred to the surgeon.

## 2019-09-04 VITALS
WEIGHT: 158.06 LBS | OXYGEN SATURATION: 95 % | SYSTOLIC BLOOD PRESSURE: 100 MMHG | TEMPERATURE: 97 F | HEIGHT: 63 IN | RESPIRATION RATE: 18 BRPM | HEART RATE: 85 BPM | BODY MASS INDEX: 28 KG/M2 | DIASTOLIC BLOOD PRESSURE: 59 MMHG

## 2019-09-04 PROCEDURE — 25000003 PHARM REV CODE 250: Performed by: UROLOGY

## 2019-09-04 PROCEDURE — 63600175 PHARM REV CODE 636 W HCPCS: Performed by: UROLOGY

## 2019-09-04 RX ORDER — OXYCODONE AND ACETAMINOPHEN 5; 325 MG/1; MG/1
1-2 TABLET ORAL EVERY 4 HOURS PRN
Qty: 30 TABLET | Refills: 0 | Status: SHIPPED | OUTPATIENT
Start: 2019-09-04 | End: 2020-01-03

## 2019-09-04 RX ORDER — CIPROFLOXACIN 500 MG/1
500 TABLET ORAL 2 TIMES DAILY
Qty: 14 TABLET | Refills: 0 | Status: SHIPPED | OUTPATIENT
Start: 2019-09-04 | End: 2019-09-11

## 2019-09-04 RX ADMIN — OXYCODONE HYDROCHLORIDE AND ACETAMINOPHEN 1 TABLET: 10; 325 TABLET ORAL at 03:09

## 2019-09-04 RX ADMIN — OXYCODONE HYDROCHLORIDE AND ACETAMINOPHEN 1 TABLET: 10; 325 TABLET ORAL at 09:09

## 2019-09-04 RX ADMIN — SODIUM CHLORIDE: 0.9 INJECTION, SOLUTION INTRAVENOUS at 02:09

## 2019-09-04 RX ADMIN — CIPROFLOXACIN HYDROCHLORIDE 500 MG: 500 TABLET, FILM COATED ORAL at 09:09

## 2019-09-04 RX ADMIN — DIPHENHYDRAMINE HYDROCHLORIDE 25 MG: 50 INJECTION INTRAMUSCULAR; INTRAVENOUS at 06:09

## 2019-09-04 NOTE — PROGRESS NOTES
Orders noted to discontinue gilmore catheter this am if urine clear, pink or punch color. Urine to  bag has punch color. Dc'd as order per Dr. Way and tolerated well. 1150 ml of punch-colored urine discarded. Patient instructed she has up to 6 hrs to void and to report 1st voiding. Verbalize understanding.

## 2019-09-04 NOTE — PLAN OF CARE
Problem: Adult Inpatient Plan of Care  Goal: Plan of Care Review  Outcome: Ongoing (interventions implemented as appropriate)  Post-op day 1 of nephrolithotomy with nephr tube to right flank. Pain controlled with po/IV meds. Tolerating regular diet and po fluids, denies nausea/vomiting. Ambulates in room to bathroom independently. IV hydration in progress. Afebrile. Safety maintained. Monitoring continues.

## 2019-09-04 NOTE — PROGRESS NOTES
Subjective:      Shannan Alford is a 38 y.o. female POD# 1 s/p PCNL.     Diet:  reg, no nausea, some flatus  Pain well controlled   Ambulating some     Objective:     Temp:  [97.4 °F (36.3 °C)-98.8 °F (37.1 °C)] 97.4 °F (36.3 °C)  Pulse:  [] 85  Resp:  [15-20] 18  SpO2:  [95 %-100 %] 95 %  BP: ()/(50-64) 100/59  I/O last 3 completed shifts:  In: 2967.5 [P.O.:780; I.V.:2187.5]  Out: 2310 [Urine:1800; Drains:250; Other:260]      Gen WDWN in NAD  Resp non-labored  CV Regular  Abd soft, non-distended  Chauhan with pink urine when removed  ZOEY with pink urine    Labs:   Recent Labs   Lab 09/03/19  1010      K 3.9      CO2 25   BUN 7   CREATININE 0.9   CALCIUM 8.7     Recent Labs   Lab 09/03/19  1010   WBC 6.52   HGB 12.3   HCT 38.1   *       Assessment/Plan:     1.  Ambulate qid in hallways  2.  D/C home, RTC Monday        Tomas Way IV, MD

## 2019-09-04 NOTE — DISCHARGE SUMMARY
Discharge Summary  Urology    Admit Date: 9/3/2019    Discharge Date: 09/04/2019    Discharge Attending Physician: Tomas Way IV, MD     Diagnoses:  Active Hospital Problems    Diagnosis  POA    *Nephrolithiasis [N20.0]  Yes    Renal stone [N20.0]  Yes    Pyelonephritis [N12]  Yes      Resolved Hospital Problems   No resolved problems to display.       Hospital Course: This patient was seen and examined on the date of discharge and determined to be suitable for discharge.    Consults: None    Pertinent Diagnostic Studies and Procedures:  See chart    Discharged Condition: good    Disposition: Home or Self Care    Follow-up Plans:      Recent Labs:  Recent Results (from the past 336 hour(s))   CBC in recovery    Collection Time: 09/03/19 10:10 AM   Result Value Ref Range    WBC 6.52 3.90 - 12.70 K/uL    Hemoglobin 12.3 12.0 - 16.0 g/dL    Hematocrit 38.1 37.0 - 48.5 %    Platelets 355 (H) 150 - 350 K/uL   CBC auto differential    Collection Time: 08/22/19  5:14 AM   Result Value Ref Range    WBC 7.33 3.90 - 12.70 K/uL    Hemoglobin 9.5 (L) 12.0 - 16.0 g/dL    Hematocrit 28.9 (L) 37.0 - 48.5 %    Platelets 275 150 - 350 K/uL     Recent Results (from the past 336 hour(s))   BMP in recovery    Collection Time: 09/03/19 10:10 AM   Result Value Ref Range    Sodium 139 136 - 145 mmol/L    Potassium 3.9 3.5 - 5.1 mmol/L    Chloride 104 95 - 110 mmol/L    CO2 25 23 - 29 mmol/L    BUN, Bld 7 6 - 20 mg/dL    Creatinine 0.9 0.5 - 1.4 mg/dL    Calcium 8.7 8.7 - 10.5 mg/dL    Anion Gap 10 8 - 16 mmol/L     No results found for: HGBA1C    Discharge Medication List:   Shannan Alford   Home Medication Instructions GLORIA:[id number]    Printed on:09/04/19 0802   Medication Information                      ciprofloxacin HCl (CIPRO) 500 MG tablet  Take 1 tablet (500 mg total) by mouth 2 (two) times daily. for 7 days             desipramine (NORPRAMIN) 75 MG tablet  Take 1 tablet (75 mg total) by mouth once daily.              oxyCODONE-acetaminophen (PERCOCET) 5-325 mg per tablet  Take 1-2 tablets by mouth every 4 (four) hours as needed for Pain.                   Discharge Procedure Orders   Diet Adult Regular     Lifting restrictions     Notify your health care provider if you experience any of the following:  temperature >100.4     Notify your health care provider if you experience any of the following:  persistent nausea and vomiting or diarrhea     Notify your health care provider if you experience any of the following:  severe uncontrolled pain     Remove dressing in 24 hours     Activity as tolerated       An excess of 30 minutes was spent discharging this patient.

## 2019-09-04 NOTE — PLAN OF CARE
Problem: Adult Inpatient Plan of Care  Goal: Plan of Care Review  Outcome: Outcome(s) achieved Date Met: 09/04/19  Fall precautions maintained. Pt free from falls/injuries.  Patient complains of pain. Pain controlled with prn meds.  Antibiotics given as prescribed.  Ambulates and repositions with assistance.  Plan of care and medications discussed with patient.  Patient verbalized understanding.  Bed locked and low, call bell within reach.  Chart check done. Will continue to monitor.

## 2019-09-05 NOTE — PLAN OF CARE
09/05/19 1003   Post-Acute Status   Post-Acute Authorization Other   Other Status No Post-Acute Service Needs

## 2019-09-05 NOTE — PLAN OF CARE
09/05/19 1004   Final Note   Assessment Type Final Discharge Note   Anticipated Discharge Disposition Home   Right Care Referral Info   Post Acute Recommendation No Care

## 2019-09-06 ENCOUNTER — TELEPHONE (OUTPATIENT)
Dept: INTERNAL MEDICINE | Facility: CLINIC | Age: 39
End: 2019-09-06

## 2019-09-06 ENCOUNTER — TELEPHONE (OUTPATIENT)
Dept: UROLOGY | Facility: CLINIC | Age: 39
End: 2019-09-06

## 2019-09-06 ENCOUNTER — PATIENT MESSAGE (OUTPATIENT)
Dept: INTERNAL MEDICINE | Facility: CLINIC | Age: 39
End: 2019-09-06

## 2019-09-06 NOTE — TELEPHONE ENCOUNTER
----- Message from Zenobia Mcgarry sent at 9/6/2019  4:01 PM CDT -----  Contact: self  Type:  Needs Medical Advice    Who Called: pt  Symptoms (please be specific):n/a   How long has patient had these symptoms: n/a  Pharmacy name and phone #:    [facility] [address] [place], [place] - [address] [address] [facility] [address]  Phone: [phone] Fax: [phone]  Would the patient rather a call back or a response via [facility]? Call back  Best Call Back Number: [phone]  Additional Information: pt states her primary care physician declined the rx for anti nausea medication and wants to know if Dr. Way will write prescription.

## 2019-09-06 NOTE — TELEPHONE ENCOUNTER
Called and spoke with pt. I told her that I did send a request to Dr Way for a rx for nausea.  Pt understands

## 2019-09-06 NOTE — TELEPHONE ENCOUNTER
----- Message from Lora Hill sent at 9/6/2019 12:46 PM CDT -----  Contact: Pt   Pt had a procedure done on Tuesday is requesting an rx for pain due to her doctor being out of town.       Pt can be contacted at [phone]

## 2019-09-06 NOTE — TELEPHONE ENCOUNTER
----- Message from Zenobia Mcgarry sent at 9/6/2019 10:23 AM CDT -----  Contact: self  Type:  RX Refill Request    Who Called: pt  Refill or New Rx:new   RX Name and Strength:nausea medication  How is the patient currently taking it? (ex. 1XDay):n/a  Is this a 30 day or 90 day Rxn/a  Preferred Pharmacy with phone number:  [facility] [address] [place], [place] - [address] [address]  [address] [address] [address]  Phone: [phone] Fax: [phone]  Local or Mail Order:local  Ordering Provider:hoa  Would the patient rather a call back or a response via [facility]? Call back  Best Call Back Number:[phone]  Additional Information: none    Thanks,  Zenobia Mcgarry

## 2019-09-06 NOTE — TELEPHONE ENCOUNTER
Chart shows prescription for Percocet 5/325 9/4/2019.    Thirty tablets.  Too soon for refill.    Not due until September 9th.

## 2019-09-07 LAB
COMPN STONE: NORMAL
SPECIMEN SOURCE: NORMAL
STONE ANALYSIS IR-IMP: NORMAL

## 2019-09-09 ENCOUNTER — OFFICE VISIT (OUTPATIENT)
Dept: UROLOGY | Facility: CLINIC | Age: 39
End: 2019-09-09
Payer: COMMERCIAL

## 2019-09-09 VITALS — BODY MASS INDEX: 28 KG/M2 | HEART RATE: 123 BPM | WEIGHT: 158 LBS | HEIGHT: 63 IN

## 2019-09-09 DIAGNOSIS — N20.0 RENAL STONE: Primary | ICD-10-CM

## 2019-09-09 LAB
BILIRUB SERPL-MCNC: NORMAL MG/DL
BLOOD URINE, POC: 250
COLOR, POC UA: NORMAL
GLUCOSE UR QL STRIP: NORMAL
KETONES UR QL STRIP: NORMAL
LEUKOCYTE ESTERASE URINE, POC: NORMAL
NITRITE, POC UA: NORMAL
PH, POC UA: 7
PROTEIN, POC: NORMAL
SPECIFIC GRAVITY, POC UA: 1
UROBILINOGEN, POC UA: NORMAL

## 2019-09-09 PROCEDURE — 81002 URINALYSIS NONAUTO W/O SCOPE: CPT | Mod: S$GLB,,, | Performed by: UROLOGY

## 2019-09-09 PROCEDURE — 99999 PR PBB SHADOW E&M-EST. PATIENT-LVL III: CPT | Mod: PBBFAC,,, | Performed by: UROLOGY

## 2019-09-09 PROCEDURE — 99024 POSTOP FOLLOW-UP VISIT: CPT | Mod: S$GLB,,, | Performed by: UROLOGY

## 2019-09-09 PROCEDURE — 81002 POCT URINE DIPSTICK WITHOUT MICROSCOPE: ICD-10-PCS | Mod: S$GLB,,, | Performed by: UROLOGY

## 2019-09-09 PROCEDURE — 99999 PR PBB SHADOW E&M-EST. PATIENT-LVL III: ICD-10-PCS | Mod: PBBFAC,,, | Performed by: UROLOGY

## 2019-09-09 PROCEDURE — 99024 PR POST-OP FOLLOW-UP VISIT: ICD-10-PCS | Mod: S$GLB,,, | Performed by: UROLOGY

## 2019-09-09 RX ORDER — PROMETHAZINE HYDROCHLORIDE 12.5 MG/1
12.5 TABLET ORAL EVERY 6 HOURS PRN
Qty: 20 TABLET | Refills: 1 | Status: SHIPPED | OUTPATIENT
Start: 2019-09-09 | End: 2020-01-03

## 2019-09-09 NOTE — PROGRESS NOTES
Chief Complaint: Right staghorn calculus; pyelonephritis    HPI:   9/9/19: PCNL had a good result, still a lateral small stone in the left kidney and debris in ureter according to CT last week.  Nephrostomy clear.  8/12/19: Right nephroureterostomy in place anticipating PCNL soon.  Asks about norpramin and I have no contraindications to it.  8/1/19: 37 yo woman since age 18 has had trouble with renal stones, and has had multiple ESWL/URS for same, mostly on the right side.  Last imaging 10 years ago while pregnant and no interval followup.  Has had a UTI for some months now without resolution.  Referred to ER by PCP after presentation with 103+ fever.  Feeling much better now.  Consistent right flank pain.  No other abd/pelvic pain and no exac/rel factors.  No hematuria.  No urinary bother.  Normal sexual function.  CT shows bilateral renal stones; on right has multiple blunted and chronically atrophied calyces with a staghorn likely obstructive in those areas.  No cris abscess but suggestive of pyelonephritis with chronic obstruction, partial XGP?    Allergies:  Morphine    Medications:  See chart    Review of Systems:  General: No fever, chills, fatigability, or weight loss.  Skin: No rashes, itching, or changes in color or texture of skin.  Chest: Denies ALVAREZ, cyanosis, wheezing, cough, and sputum production.  Abdomen: Appetite fine. No weight loss. Denies diarrhea, abdominal pain, hematemesis, or blood in stool.  Musculoskeletal: No joint stiffness or swelling. Denies back pain.  : As above.  All other review of systems negative.    PMH:   has a past medical history of ADHD (attention deficit hyperactivity disorder), Depression, History of cervical cerclage, Nephrolithiasis, and PONV (postoperative nausea and vomiting).    PSH:   has a past surgical history that includes Fluoroscopy (Right, 8/1/2019); Percutaneous nephrostolithotomy (Right, 9/3/2019); and Antegrade nephrostography (Right, 9/3/2019).  ESWL/URS.    FamHx: family history includes Hypertension in her mother; No Known Problems in her father.    SocHx:  reports that she has never smoked. She has never used smokeless tobacco. She reports that she drinks alcohol. She reports that she does not use drugs.      Physical Exam:  Vitals:    09/09/19 1044   Pulse: (!) 123     General: A&Ox3, no apparent distress, no deformities  Neck: No masses, normal thyroid  Lungs: normal inspiration, no use of accessory muscles  Heart: normal pulse, no arrhythmias  Abdomen: Soft, NT, ND  Skin: The skin is warm and dry. No jaundice.  Ext: No c/c/e.  : deferred    Labs/Studies: see chart    Impression/Plan:   1. Remove nephrostomy today.  RTC with CT in 4 wks.

## 2019-10-09 ENCOUNTER — HOSPITAL ENCOUNTER (OUTPATIENT)
Dept: RADIOLOGY | Facility: HOSPITAL | Age: 39
Discharge: HOME | End: 2019-10-09
Attending: UROLOGY
Payer: COMMERCIAL

## 2019-10-09 DIAGNOSIS — N20.0 RENAL STONE: ICD-10-CM

## 2019-10-09 PROCEDURE — 74176 CT ABD & PELVIS W/O CONTRAST: CPT | Mod: TC

## 2019-10-11 ENCOUNTER — PATIENT MESSAGE (OUTPATIENT)
Dept: INTERNAL MEDICINE | Facility: CLINIC | Age: 39
End: 2019-10-11

## 2019-10-16 ENCOUNTER — OFFICE VISIT (OUTPATIENT)
Dept: UROLOGY | Facility: CLINIC | Age: 39
End: 2019-10-16
Payer: COMMERCIAL

## 2019-10-16 VITALS
SYSTOLIC BLOOD PRESSURE: 114 MMHG | DIASTOLIC BLOOD PRESSURE: 74 MMHG | HEIGHT: 63 IN | HEART RATE: 79 BPM | BODY MASS INDEX: 28 KG/M2 | WEIGHT: 158.06 LBS

## 2019-10-16 DIAGNOSIS — N20.0 RENAL STONE: Primary | ICD-10-CM

## 2019-10-16 LAB
BILIRUB SERPL-MCNC: ABNORMAL MG/DL
BLOOD URINE, POC: ABNORMAL
COLOR, POC UA: YELLOW
GLUCOSE UR QL STRIP: ABNORMAL
KETONES UR QL STRIP: ABNORMAL
LEUKOCYTE ESTERASE URINE, POC: ABNORMAL
NITRITE, POC UA: ABNORMAL
PH, POC UA: 7
PROTEIN, POC: ABNORMAL
SPECIFIC GRAVITY, POC UA: 1
UROBILINOGEN, POC UA: ABNORMAL

## 2019-10-16 PROCEDURE — 99999 PR PBB SHADOW E&M-EST. PATIENT-LVL II: CPT | Mod: PBBFAC,,, | Performed by: UROLOGY

## 2019-10-16 PROCEDURE — 81002 POCT URINE DIPSTICK WITHOUT MICROSCOPE: ICD-10-PCS | Mod: S$GLB,,, | Performed by: UROLOGY

## 2019-10-16 PROCEDURE — 99024 POSTOP FOLLOW-UP VISIT: CPT | Mod: S$GLB,,, | Performed by: UROLOGY

## 2019-10-16 PROCEDURE — 81002 URINALYSIS NONAUTO W/O SCOPE: CPT | Mod: S$GLB,,, | Performed by: UROLOGY

## 2019-10-16 PROCEDURE — 99024 PR POST-OP FOLLOW-UP VISIT: ICD-10-PCS | Mod: S$GLB,,, | Performed by: UROLOGY

## 2019-10-16 PROCEDURE — 99999 PR PBB SHADOW E&M-EST. PATIENT-LVL II: ICD-10-PCS | Mod: PBBFAC,,, | Performed by: UROLOGY

## 2019-10-16 NOTE — PROGRESS NOTES
Chief Complaint: Right staghorn calculus; pyelonephritis    HPI:   10/16/19: Still passing some fragments.  Feeling well and better than in the last 2 years.  CT last week showed a string of stones in the distal right ureter and she has passed 7 stones in the last week of good size.    9/9/19: PCNL had a good result, still a lateral small stone in the left kidney and debris in ureter according to CT last week.  Nephrostomy clear.  8/12/19: Right nephroureterostomy in place anticipating PCNL soon.  Asks about norpramin and I have no contraindications to it.  8/1/19: 37 yo woman since age 18 has had trouble with renal stones, and has had multiple ESWL/URS for same, mostly on the right side.  Last imaging 10 years ago while pregnant and no interval followup.  Has had a UTI for some months now without resolution.  Referred to ER by PCP after presentation with 103+ fever.  Feeling much better now.  Consistent right flank pain.  No other abd/pelvic pain and no exac/rel factors.  No hematuria.  No urinary bother.  Normal sexual function.  CT shows bilateral renal stones; on right has multiple blunted and chronically atrophied calyces with a staghorn likely obstructive in those areas.  No cris abscess but suggestive of pyelonephritis with chronic obstruction, partial XGP?    Allergies:  Morphine    Medications:  See chart    Review of Systems:  General: No fever, chills, fatigability, or weight loss.  Skin: No rashes, itching, or changes in color or texture of skin.  Chest: Denies ALVAREZ, cyanosis, wheezing, cough, and sputum production.  Abdomen: Appetite fine. No weight loss. Denies diarrhea, abdominal pain, hematemesis, or blood in stool.  Musculoskeletal: No joint stiffness or swelling. Denies back pain.  : As above.  All other review of systems negative.    PMH:   has a past medical history of ADHD (attention deficit hyperactivity disorder), Depression, History of cervical cerclage, Nephrolithiasis, and PONV  (postoperative nausea and vomiting).    PSH:   has a past surgical history that includes Fluoroscopy (Right, 8/1/2019); Percutaneous nephrostolithotomy (Right, 9/3/2019); and Antegrade nephrostography (Right, 9/3/2019). ESWL/URS.    FamHx: family history includes Hypertension in her mother; No Known Problems in her father.    SocHx:  reports that she has never smoked. She has never used smokeless tobacco. She reports that she drinks alcohol. She reports that she does not use drugs.      Physical Exam:  Vitals:    10/16/19 1052   BP: 114/74   Pulse: 79     General: A&Ox3, no apparent distress, no deformities  Neck: No masses, normal thyroid  Lungs: normal inspiration, no use of accessory muscles  Heart: normal pulse, no arrhythmias  Abdomen: Soft, NT, ND  Skin: The skin is warm and dry. No jaundice.  Ext: No c/c/e.  : deferred    Labs/Studies: see chart    Impression/Plan:   1. Could move to URS but may not be needed. RTC with CT in 4 wks.  Likely plan is ESWL/stent removal as staged procedure.  Trying to avoid URS.

## 2019-11-06 ENCOUNTER — HOSPITAL ENCOUNTER (OUTPATIENT)
Dept: RADIOLOGY | Facility: HOSPITAL | Age: 39
Discharge: HOME | End: 2019-11-06
Attending: UROLOGY
Payer: COMMERCIAL

## 2019-11-06 DIAGNOSIS — N20.0 RENAL STONE: ICD-10-CM

## 2019-11-06 PROCEDURE — 74176 CT ABD & PELVIS W/O CONTRAST: CPT | Mod: TC

## 2019-11-07 ENCOUNTER — OFFICE VISIT (OUTPATIENT)
Dept: UROLOGY | Facility: CLINIC | Age: 39
End: 2019-11-07
Payer: COMMERCIAL

## 2019-11-07 VITALS — DIASTOLIC BLOOD PRESSURE: 76 MMHG | WEIGHT: 153.56 LBS | SYSTOLIC BLOOD PRESSURE: 120 MMHG | BODY MASS INDEX: 27.2 KG/M2

## 2019-11-07 DIAGNOSIS — N20.0 RENAL STONE: Primary | ICD-10-CM

## 2019-11-07 LAB
BILIRUB SERPL-MCNC: NORMAL MG/DL
BLOOD URINE, POC: NORMAL
COLOR, POC UA: YELLOW
GLUCOSE UR QL STRIP: NORMAL
KETONES UR QL STRIP: NORMAL
LEUKOCYTE ESTERASE URINE, POC: NORMAL
NITRITE, POC UA: NORMAL
PH, POC UA: 6
PROTEIN, POC: NORMAL
SPECIFIC GRAVITY, POC UA: 1.01
UROBILINOGEN, POC UA: NORMAL

## 2019-11-07 PROCEDURE — 99999 PR PBB SHADOW E&M-EST. PATIENT-LVL II: ICD-10-PCS | Mod: PBBFAC,,, | Performed by: UROLOGY

## 2019-11-07 PROCEDURE — 81002 POCT URINE DIPSTICK WITHOUT MICROSCOPE: ICD-10-PCS | Mod: S$GLB,,, | Performed by: UROLOGY

## 2019-11-07 PROCEDURE — 99999 PR PBB SHADOW E&M-EST. PATIENT-LVL II: CPT | Mod: PBBFAC,,, | Performed by: UROLOGY

## 2019-11-07 PROCEDURE — 99024 PR POST-OP FOLLOW-UP VISIT: ICD-10-PCS | Mod: S$GLB,,, | Performed by: UROLOGY

## 2019-11-07 PROCEDURE — 99024 POSTOP FOLLOW-UP VISIT: CPT | Mod: S$GLB,,, | Performed by: UROLOGY

## 2019-11-07 PROCEDURE — 81002 URINALYSIS NONAUTO W/O SCOPE: CPT | Mod: S$GLB,,, | Performed by: UROLOGY

## 2019-11-07 NOTE — PROGRESS NOTES
Chief Complaint: Right staghorn calculus; pyelonephritis    HPI:   11/7/19: Has cleared all the fragments and it will be time to remove the right ureteral stent.  Has diffuse bilateral nephrocalcinosis no further surgical management needed.  24 hour urine indicated.  10/16/19: Still passing some fragments.  Feeling well and better than in the last 2 years.  CT last week showed a string of stones in the distal right ureter and she has passed 7 stones in the last week of good size.    9/9/19: PCNL had a good result, still a lateral small stone in the left kidney and debris in ureter according to CT last week.  Nephrostomy clear.  8/12/19: Right nephroureterostomy in place anticipating PCNL soon.  Asks about norpramin and I have no contraindications to it.  8/1/19: 39 yo woman since age 18 has had trouble with renal stones, and has had multiple ESWL/URS for same, mostly on the right side.  Last imaging 10 years ago while pregnant and no interval followup.  Has had a UTI for some months now without resolution.  Referred to ER by PCP after presentation with 103+ fever.  Feeling much better now.  Consistent right flank pain.  No other abd/pelvic pain and no exac/rel factors.  No hematuria.  No urinary bother.  Normal sexual function.  CT shows bilateral renal stones; on right has multiple blunted and chronically atrophied calyces with a staghorn likely obstructive in those areas.  No cris abscess but suggestive of pyelonephritis with chronic obstruction, partial XGP?    Allergies:  Morphine    Medications:  See chart    Review of Systems:  General: No fever, chills, fatigability, or weight loss.  Skin: No rashes, itching, or changes in color or texture of skin.  Chest: Denies ALVAREZ, cyanosis, wheezing, cough, and sputum production.  Abdomen: Appetite fine. No weight loss. Denies diarrhea, abdominal pain, hematemesis, or blood in stool.  Musculoskeletal: No joint stiffness or swelling. Denies back pain.  : As above.  All  other review of systems negative.    PMH:   has a past medical history of ADHD (attention deficit hyperactivity disorder), Depression, History of cervical cerclage, Nephrolithiasis, and PONV (postoperative nausea and vomiting).    PSH:   has a past surgical history that includes Fluoroscopy (Right, 8/1/2019); Percutaneous nephrostolithotomy (Right, 9/3/2019); and Antegrade nephrostography (Right, 9/3/2019). ESWL/URS.    FamHx: family history includes Hypertension in her mother; No Known Problems in her father.    SocHx:  reports that she has never smoked. She has never used smokeless tobacco. She reports that she drinks alcohol. She reports that she does not use drugs.      Physical Exam:  Vitals:    11/07/19 1519   BP: 120/76     General: A&Ox3, no apparent distress, no deformities  Neck: No masses, normal thyroid  Lungs: normal inspiration, no use of accessory muscles  Heart: normal pulse, no arrhythmias  Abdomen: Soft, NT, ND  Skin: The skin is warm and dry. No jaundice.  Ext: No c/c/e.  : deferred    Labs/Studies: see chart    Impression/Plan:   1.D/C stent soon then RTC 24 hour urine study.

## 2019-11-20 ENCOUNTER — OFFICE VISIT (OUTPATIENT)
Dept: UROLOGY | Facility: CLINIC | Age: 39
End: 2019-11-20
Payer: COMMERCIAL

## 2019-11-20 VITALS — WEIGHT: 153.69 LBS | HEIGHT: 63 IN | BODY MASS INDEX: 27.23 KG/M2

## 2019-11-20 DIAGNOSIS — N20.0 RENAL STONE: Primary | ICD-10-CM

## 2019-11-20 PROCEDURE — 81002 POCT URINE DIPSTICK WITHOUT MICROSCOPE: ICD-10-PCS | Mod: S$GLB,,, | Performed by: UROLOGY

## 2019-11-20 PROCEDURE — 52310 CYSTOSCOPY AND TREATMENT: CPT | Mod: S$GLB,,, | Performed by: UROLOGY

## 2019-11-20 PROCEDURE — 81002 URINALYSIS NONAUTO W/O SCOPE: CPT | Mod: S$GLB,,, | Performed by: UROLOGY

## 2019-11-20 PROCEDURE — 52310 PR CYSTOSCOPY,REMV CALCULUS,SIMPLE: ICD-10-PCS | Mod: S$GLB,,, | Performed by: UROLOGY

## 2019-11-20 PROCEDURE — 99999 PR PBB SHADOW E&M-EST. PATIENT-LVL II: CPT | Mod: PBBFAC,,, | Performed by: UROLOGY

## 2019-11-20 PROCEDURE — 96372 THER/PROPH/DIAG INJ SC/IM: CPT | Mod: 59,S$GLB,, | Performed by: UROLOGY

## 2019-11-20 PROCEDURE — 99499 UNLISTED E&M SERVICE: CPT | Mod: S$GLB,,, | Performed by: UROLOGY

## 2019-11-20 PROCEDURE — 99499 NO LOS: ICD-10-PCS | Mod: S$GLB,,, | Performed by: UROLOGY

## 2019-11-20 PROCEDURE — 96372 PR INJECTION,THERAP/PROPH/DIAG2ST, IM OR SUBCUT: ICD-10-PCS | Mod: 59,S$GLB,, | Performed by: UROLOGY

## 2019-11-20 PROCEDURE — 99999 PR PBB SHADOW E&M-EST. PATIENT-LVL II: ICD-10-PCS | Mod: PBBFAC,,, | Performed by: UROLOGY

## 2019-11-20 RX ORDER — CEFTRIAXONE 1 G/1
1 INJECTION, POWDER, FOR SOLUTION INTRAMUSCULAR; INTRAVENOUS ONCE
Status: COMPLETED | OUTPATIENT
Start: 2019-11-20 | End: 2019-11-20

## 2019-11-20 RX ADMIN — CEFTRIAXONE 1 G: 1 INJECTION, POWDER, FOR SOLUTION INTRAMUSCULAR; INTRAVENOUS at 08:11

## 2019-11-20 NOTE — PROGRESS NOTES
..Per Dr. Way IM rocephin given to pt via right ventrogluteal using aseptic technique. Pt tolerated well. Pt instructed to remain in clinic for 15 minutes after injection to monitor for signs & symptoms of adverse reaction. Pt verbalized understanding.

## 2019-11-20 NOTE — PROGRESS NOTES
Chief Complaint: Right staghorn calculus; pyelonephritis    HPI:   11/20/19: Cysto right stent removed today.  11/7/19: Has cleared all the fragments and it will be time to remove the right ureteral stent.  Has diffuse bilateral nephrocalcinosis no further surgical management needed.  24 hour urine indicated.  10/16/19: Still passing some fragments.  Feeling well and better than in the last 2 years.  CT last week showed a string of stones in the distal right ureter and she has passed 7 stones in the last week of good size.    9/9/19: PCNL had a good result, still a lateral small stone in the left kidney and debris in ureter according to CT last week.  Nephrostomy clear.  8/12/19: Right nephroureterostomy in place anticipating PCNL soon.  Asks about norpramin and I have no contraindications to it.  8/1/19: 39 yo woman since age 18 has had trouble with renal stones, and has had multiple ESWL/URS for same, mostly on the right side.  Last imaging 10 years ago while pregnant and no interval followup.  Has had a UTI for some months now without resolution.  Referred to ER by PCP after presentation with 103+ fever.  Feeling much better now.  Consistent right flank pain.  No other abd/pelvic pain and no exac/rel factors.  No hematuria.  No urinary bother.  Normal sexual function.  CT shows bilateral renal stones; on right has multiple blunted and chronically atrophied calyces with a staghorn likely obstructive in those areas.  No cris abscess but suggestive of pyelonephritis with chronic obstruction, partial XGP?    Allergies:  Morphine    Medications:  See chart    Review of Systems:  General: No fever, chills, fatigability, or weight loss.  Skin: No rashes, itching, or changes in color or texture of skin.  Chest: Denies ALVAREZ, cyanosis, wheezing, cough, and sputum production.  Abdomen: Appetite fine. No weight loss. Denies diarrhea, abdominal pain, hematemesis, or blood in stool.  Musculoskeletal: No joint stiffness or  swelling. Denies back pain.  : As above.  All other review of systems negative.    PMH:   has a past medical history of ADHD (attention deficit hyperactivity disorder), Depression, History of cervical cerclage, Nephrolithiasis, and PONV (postoperative nausea and vomiting).    PSH:   has a past surgical history that includes Fluoroscopy (Right, 8/1/2019); Percutaneous nephrostolithotomy (Right, 9/3/2019); and Antegrade nephrostography (Right, 9/3/2019). ESWL/URS.    FamHx: family history includes Hypertension in her mother; No Known Problems in her father.    SocHx:  reports that she has never smoked. She has never used smokeless tobacco. She reports that she drinks alcohol. She reports that she does not use drugs.      Physical Exam:  There were no vitals filed for this visit.  General: A&Ox3, no apparent distress, no deformities  Neck: No masses, normal thyroid  Lungs: normal inspiration, no use of accessory muscles  Heart: normal pulse, no arrhythmias  Abdomen: Soft, NT, ND  Skin: The skin is warm and dry. No jaundice.  Ext: No c/c/e.  : deferred    Labs/Studies: see chart    Procedure:  Cystoscopy with removal of foreign body - simple (ureteral stent)    Procedure in Detail: After proper consents were obtained, the patient was prepped and draped in normal sterile fashion for diagnostic cystoscopy. 5 ml of lidocaine jelly was instilled in the urethra. The flexible cystoscope was then introduced into the urethra, and advanced into the bladder under direct vision. The previously placed stent was observed and was held with graspers.  The cystoscope was then removed, and the procedure terminated. The stent was removed in its entirety.     Findings: right ureteral stent removed without difficulty    ABx: Rocephin 1 gm IM    Impression/Plan:   1.RTC 24 hour urine study with US 1 mo.

## 2019-11-21 ENCOUNTER — PATIENT MESSAGE (OUTPATIENT)
Dept: INTERNAL MEDICINE | Facility: CLINIC | Age: 39
End: 2019-11-21

## 2019-12-13 ENCOUNTER — TELEPHONE (OUTPATIENT)
Dept: RADIOLOGY | Facility: HOSPITAL | Age: 39
End: 2019-12-13

## 2020-01-03 ENCOUNTER — OFFICE VISIT (OUTPATIENT)
Dept: INTERNAL MEDICINE | Facility: CLINIC | Age: 40
End: 2020-01-03
Payer: COMMERCIAL

## 2020-01-03 VITALS
TEMPERATURE: 98 F | BODY MASS INDEX: 27.73 KG/M2 | SYSTOLIC BLOOD PRESSURE: 104 MMHG | OXYGEN SATURATION: 98 % | WEIGHT: 156.5 LBS | DIASTOLIC BLOOD PRESSURE: 76 MMHG | HEIGHT: 63 IN | HEART RATE: 96 BPM

## 2020-01-03 DIAGNOSIS — F41.9 ANXIETY AND DEPRESSION: Primary | ICD-10-CM

## 2020-01-03 DIAGNOSIS — F32.A ANXIETY AND DEPRESSION: Primary | ICD-10-CM

## 2020-01-03 DIAGNOSIS — F90.0 ADHD, PREDOMINANTLY INATTENTIVE TYPE: ICD-10-CM

## 2020-01-03 PROBLEM — N20.0 RENAL STONE: Status: RESOLVED | Noted: 2019-08-12 | Resolved: 2020-01-03

## 2020-01-03 PROBLEM — N12 PYELONEPHRITIS: Status: RESOLVED | Noted: 2019-07-31 | Resolved: 2020-01-03

## 2020-01-03 PROBLEM — N39.0 UTI (URINARY TRACT INFECTION): Status: RESOLVED | Noted: 2019-08-19 | Resolved: 2020-01-03

## 2020-01-03 PROBLEM — A41.9 SEPSIS: Status: RESOLVED | Noted: 2019-08-19 | Resolved: 2020-01-03

## 2020-01-03 PROBLEM — E87.6 HYPOKALEMIA: Status: RESOLVED | Noted: 2019-08-20 | Resolved: 2020-01-03

## 2020-01-03 PROCEDURE — 3008F BODY MASS INDEX DOCD: CPT | Mod: CPTII,S$GLB,, | Performed by: FAMILY MEDICINE

## 2020-01-03 PROCEDURE — 99214 OFFICE O/P EST MOD 30 MIN: CPT | Mod: S$GLB,,, | Performed by: FAMILY MEDICINE

## 2020-01-03 PROCEDURE — 99999 PR PBB SHADOW E&M-EST. PATIENT-LVL III: CPT | Mod: PBBFAC,,, | Performed by: FAMILY MEDICINE

## 2020-01-03 PROCEDURE — 99999 PR PBB SHADOW E&M-EST. PATIENT-LVL III: ICD-10-PCS | Mod: PBBFAC,,, | Performed by: FAMILY MEDICINE

## 2020-01-03 PROCEDURE — 99214 PR OFFICE/OUTPT VISIT, EST, LEVL IV, 30-39 MIN: ICD-10-PCS | Mod: S$GLB,,, | Performed by: FAMILY MEDICINE

## 2020-01-03 PROCEDURE — 3008F PR BODY MASS INDEX (BMI) DOCUMENTED: ICD-10-PCS | Mod: CPTII,S$GLB,, | Performed by: FAMILY MEDICINE

## 2020-01-03 RX ORDER — BUPROPION HYDROCHLORIDE 150 MG/1
150 TABLET ORAL DAILY
Qty: 90 TABLET | Refills: 0 | Status: SHIPPED | OUTPATIENT
Start: 2020-01-03 | End: 2020-02-14 | Stop reason: SDUPTHER

## 2020-01-03 NOTE — PROGRESS NOTES
"Subjective:   Patient ID: Shannan Alford is a 39 y.o. female.  Chief Complaint:  Med Change      Patient with previous diagnosis anxiety/depression / ADHD.    Was on Norpramin for years.    Due to recent underlying renal disease and diagnosis, discontinued medication 2 months ago.    Specialist has recommended trial of Wellbutrin as safest medication to help with her symptoms.  No previous medications except for Norpramin  Off medication, symptoms have recurred or starting to affect her ADLs.  Labs stable August/September 2019.    Review of Systems   Constitutional: Positive for fatigue. Negative for activity change and appetite change.   Respiratory: Negative for chest tightness and shortness of breath.    Cardiovascular: Negative for chest pain and palpitations.   Gastrointestinal: Negative for abdominal pain, diarrhea, nausea and vomiting.   Genitourinary: Negative for pelvic pain.   Musculoskeletal: Negative for back pain, myalgias and neck pain.   Skin: Negative for rash.   Neurological: Negative for dizziness, tremors, weakness, light-headedness, numbness and headaches.   Psychiatric/Behavioral: Positive for decreased concentration, dysphoric mood and sleep disturbance. Negative for agitation, behavioral problems, confusion, hallucinations, self-injury and suicidal ideas. The patient is nervous/anxious. The patient is not hyperactive.      Objective:   /76 (BP Location: Right arm, Patient Position: Sitting, BP Method: Medium (Manual))   Pulse 96   Temp 98.1 °F (36.7 °C) (Tympanic)   Ht 5' 3" (1.6 m)   Wt 71 kg (156 lb 8.4 oz)   SpO2 98%   BMI 27.73 kg/m²     Physical Exam   Constitutional: Vital signs are normal. She appears well-developed and well-nourished. She is cooperative.  Non-toxic appearance. She does not have a sickly appearance. She does not appear ill. No distress.   Neck: No thyroid mass and no thyromegaly present.   Cardiovascular: Normal rate and regular rhythm. "   Pulmonary/Chest: Effort normal. No tachypnea and no bradypnea. No respiratory distress.   Abdominal: Soft. She exhibits no distension. There is no tenderness.   Musculoskeletal: She exhibits no edema.   Neurological: She displays a negative Romberg sign. Coordination and gait normal.   Skin: Skin is warm, dry and intact. No abrasion, no bruising, no ecchymosis and no rash noted.   Psychiatric: She has a normal mood and affect. Her speech is normal and behavior is normal. Judgment and thought content normal. Her mood appears not anxious. Her affect is not angry, not blunt, not labile and not inappropriate. She is not agitated, not aggressive, not hyperactive, not slowed, not withdrawn, not actively hallucinating and not combative. Thought content is not paranoid and not delusional. Cognition and memory are normal. She does not exhibit a depressed mood. She expresses no homicidal and no suicidal ideation. She is attentive.   Nursing note and vitals reviewed.    Assessment:       ICD-10-CM ICD-9-CM   1. Anxiety and depression F41.9 300.00    F32.9 311   2. ADHD, predominantly inattentive type F90.0 314.00     Plan:   Anxiety and depression  ADHD, predominantly inattentive type  -     buPROPion (WELLBUTRIN XL) 150 MG TB24 tablet; Take 1 tablet (150 mg total) by mouth once daily.  Dispense: 90 tablet; Refill: 0    Trial Wellbutrin  mg daily.    Increase dose as tolerated /needed  Reviewed risk/ benefits of treatment  Reviewed potential side effects.    Discussed black box warnings.    Return to clinic 6 weeks

## 2020-02-14 ENCOUNTER — OFFICE VISIT (OUTPATIENT)
Dept: INTERNAL MEDICINE | Facility: CLINIC | Age: 40
End: 2020-02-14
Payer: COMMERCIAL

## 2020-02-14 VITALS
HEART RATE: 76 BPM | HEIGHT: 63 IN | TEMPERATURE: 98 F | OXYGEN SATURATION: 98 % | BODY MASS INDEX: 26.09 KG/M2 | SYSTOLIC BLOOD PRESSURE: 106 MMHG | DIASTOLIC BLOOD PRESSURE: 84 MMHG | WEIGHT: 147.25 LBS

## 2020-02-14 DIAGNOSIS — F41.9 ANXIETY AND DEPRESSION: Primary | ICD-10-CM

## 2020-02-14 DIAGNOSIS — F32.A ANXIETY AND DEPRESSION: Primary | ICD-10-CM

## 2020-02-14 DIAGNOSIS — F90.0 ADHD, PREDOMINANTLY INATTENTIVE TYPE: ICD-10-CM

## 2020-02-14 PROCEDURE — 99214 PR OFFICE/OUTPT VISIT, EST, LEVL IV, 30-39 MIN: ICD-10-PCS | Mod: S$GLB,,, | Performed by: FAMILY MEDICINE

## 2020-02-14 PROCEDURE — 99999 PR PBB SHADOW E&M-EST. PATIENT-LVL III: ICD-10-PCS | Mod: PBBFAC,,, | Performed by: FAMILY MEDICINE

## 2020-02-14 PROCEDURE — 99999 PR PBB SHADOW E&M-EST. PATIENT-LVL III: CPT | Mod: PBBFAC,,, | Performed by: FAMILY MEDICINE

## 2020-02-14 PROCEDURE — 99214 OFFICE O/P EST MOD 30 MIN: CPT | Mod: S$GLB,,, | Performed by: FAMILY MEDICINE

## 2020-02-14 RX ORDER — BUPROPION HYDROCHLORIDE 300 MG/1
300 TABLET ORAL DAILY
Qty: 90 TABLET | Refills: 3 | Status: SHIPPED | OUTPATIENT
Start: 2020-02-14 | End: 2020-05-14

## 2020-02-14 NOTE — PROGRESS NOTES
"Subjective:   Patient ID: Shannan Alford is a 39 y.o. female.  Chief Complaint:  Follow-up (6 wk)      Events follow-up on anxiety/ depression / ADHD   Last visit January 2020   Discontinue imipramine due to chronic /kidney stone issues.    Changed to Wellbutrin XL.    Presently 300 mg daily.  Effective with symptoms controlled on present medication and dose. Denies any side effects.  Happy with medication.  Wants to continue.    Thinks actually working better than previous medication.  No additional complaints or concerns today.      Current Outpatient Medications:     buPROPion (WELLBUTRIN XL) 300 MG 24 hr tablet, Take 1 tablet (300 mg total) by mouth once daily., Disp: 90 tablet, Rfl: 3    Review of Systems   Constitutional: Negative for activity change, appetite change and fatigue.   Eyes: Negative for visual disturbance.   Respiratory: Negative for chest tightness and shortness of breath.    Cardiovascular: Negative for chest pain and palpitations.   Gastrointestinal: Negative for abdominal pain, diarrhea, nausea and vomiting.   Genitourinary: Negative for pelvic pain.   Musculoskeletal: Negative for back pain, myalgias and neck pain.   Skin: Negative for rash.   Neurological: Negative for dizziness, tremors, weakness, light-headedness, numbness and headaches.   Psychiatric/Behavioral: Negative for agitation, behavioral problems, confusion, decreased concentration, dysphoric mood, hallucinations, self-injury, sleep disturbance and suicidal ideas. The patient is not nervous/anxious and is not hyperactive.      Objective:   /84 (BP Location: Left arm, Patient Position: Sitting, BP Method: Large (Manual))   Pulse 76   Temp 97.9 °F (36.6 °C) (Oral)   Ht 5' 3" (1.6 m)   Wt 66.8 kg (147 lb 4.3 oz)   LMP 02/08/2020 (Exact Date)   SpO2 98%   BMI 26.09 kg/m²     Physical Exam   Constitutional: Vital signs are normal. She appears well-developed and well-nourished. She is cooperative.  Non-toxic " appearance. She does not have a sickly appearance. She does not appear ill. No distress.   Neck: No thyroid mass and no thyromegaly present.   Cardiovascular: Normal rate and regular rhythm.   Pulmonary/Chest: Effort normal. No tachypnea and no bradypnea. No respiratory distress.   Abdominal: Soft. She exhibits no distension. There is no tenderness.   Musculoskeletal: She exhibits no edema.   Neurological: She displays a negative Romberg sign. Coordination and gait normal.   Skin: Skin is warm, dry and intact. No abrasion, no bruising, no ecchymosis and no rash noted.   Psychiatric: She has a normal mood and affect. Her speech is normal and behavior is normal. Judgment and thought content normal. Her mood appears not anxious. Her affect is not angry, not blunt, not labile and not inappropriate. She is not agitated, not aggressive, not hyperactive, not slowed, not withdrawn, not actively hallucinating and not combative. Thought content is not paranoid and not delusional. Cognition and memory are normal. She does not exhibit a depressed mood. She expresses no homicidal and no suicidal ideation. She is attentive.   Nursing note and vitals reviewed.    Assessment:       ICD-10-CM ICD-9-CM   1. Anxiety and depression F41.9 300.00    F32.9 311   2. ADHD, predominantly inattentive type F90.0 314.00     Plan:   Anxiety and depression  ADHD, predominantly inattentive type  -     buPROPion (WELLBUTRIN XL) 300 MG 24 hr tablet; Take 1 tablet (300 mg total) by mouth once daily.  Dispense: 90 tablet; Refill: 3    Stable, no side effects, mood and symptoms well controlled on present medication .  Continue  Wellbutrin  mg daily  Return to clinic 6 months or sooner as needed.

## 2021-03-25 ENCOUNTER — PATIENT MESSAGE (OUTPATIENT)
Dept: ADMINISTRATIVE | Facility: HOSPITAL | Age: 41
End: 2021-03-25

## 2021-05-12 ENCOUNTER — PATIENT MESSAGE (OUTPATIENT)
Dept: RESEARCH | Facility: HOSPITAL | Age: 41
End: 2021-05-12

## 2022-02-07 NOTE — HPI
"Shannan Alford is a 38 year old female with ADHD and recurrent UTIs who presents with a worsening "kidney infection." The patient states she was diagnosed by her PCP on 6/17/19 and started on Cipro. She was referred to outpatient Urology. The patient reports right flank pain that has gotten worse despite antibiotics. Today, she came in with a high fever of 103.5F. The patient has not seen a Urologist Reyna Mitchell, but has had recurrent UTIs every 6 weeks and established care with Urology in [place]. She denies any chills, cough, SOB, abdominal pain, dysuria, hematuria, urinary frequency, urinary urgency, vomiting and all other symptoms at this time. The patient states she last took Tylenol at noon. In the ED, her WBC count was 16,000. UA was described as hazy, with many bacteria, 40 WBCs, 2+ leukocyte terry ace and negative nitrites. CT of the abdomen and pelvis showed bilateral nonobstructing renal calculi, some with staghorn configuration, but no evidence of obstruction and a hyperenhancing wall within the right ureter seen with infection. The patient was given 1g IV Rocephin in the ED. Dr. Way with Urology was consulted in the ED and reports emergency surgery is not needed tonight and he will see patient in the morning.   " Detail Level: Simple Additional Notes: Patient consent was obtained to proceed with the visit and recommended plan of care after discussion of all risks and benefits, including the risks of COVID-19 exposure.

## 2022-04-27 NOTE — NURSING
Patient wants to know if she can start rehab on her left shoulder today. States she still feels like she has been hit by a truck.    Routed to Dr. Gurwinder uribe to advise    Pt BP 90/53. Asymptomatic. Contacted Livia Hayes NP. No new orders.

## 2022-09-19 ENCOUNTER — PATIENT OUTREACH (OUTPATIENT)
Dept: ADMINISTRATIVE | Facility: HOSPITAL | Age: 42
End: 2022-09-19
Payer: COMMERCIAL

## 2022-09-19 NOTE — PROGRESS NOTES
Called patient to schedule overdue PCP appt, Patient answered and stated that she switched to another PCP.

## (undated) DEVICE — SHEATH AMPLATZ OPQ 30FR 30CM

## (undated) DEVICE — MANIFOLD 4 PORT

## (undated) DEVICE — SEE MEDLINE ITEM 157027

## (undated) DEVICE — GLOVE SURGICAL LATEX SZ 8

## (undated) DEVICE — BAG DRAIN URINARY CONT IRRG

## (undated) DEVICE — SUT ETHILON 2-0 BLK PS-2

## (undated) DEVICE — CYBERWAND PROBE SET

## (undated) DEVICE — Device

## (undated) DEVICE — CATH URETERAL DUAL LUMEN 10FR

## (undated) DEVICE — DRAPE MOBILE C-ARM

## (undated) DEVICE — SEE MEDLINE ITEM 157149

## (undated) DEVICE — SOL IRR NACL .9% 3000ML

## (undated) DEVICE — SEE MEDLINE ITEM 152739

## (undated) DEVICE — GUIDEWIRE AMPLATZ .035X145CM

## (undated) DEVICE — BLLN ULTRAXX NEPHSTMY 6MM 15CM

## (undated) DEVICE — SUT MONOCRYL 4.0 PS2 CP496G

## (undated) DEVICE — WIRE GUIDE 0.038OLD